# Patient Record
Sex: MALE | Race: WHITE | NOT HISPANIC OR LATINO | Employment: FULL TIME | URBAN - METROPOLITAN AREA
[De-identification: names, ages, dates, MRNs, and addresses within clinical notes are randomized per-mention and may not be internally consistent; named-entity substitution may affect disease eponyms.]

---

## 2017-06-07 ENCOUNTER — TELEPHONE (OUTPATIENT)
Dept: HEMATOLOGY ONCOLOGY | Facility: MEDICAL CENTER | Age: 52
End: 2017-06-07

## 2017-06-07 NOTE — TELEPHONE ENCOUNTER
Received a phone call from this patient stating he would like to establish care in our office. He is currently followed by Dr. Lagos but his office no longer takes his insurance. He states he is needing to have a biopsy done asap and has been holding her Xeralto for one day now. I did inform him that our office would need to obtain his prior medical records and have them approved by Dr. Moss before we could have him scheduled. Patient understood and was agreeable to the current plan. I did ask him to call be back today around 4:30pm if he has not heard back from me for an update.     Dr. Lagos  Ph: [phone]

## 2017-06-08 ENCOUNTER — HOSPITAL ENCOUNTER (OUTPATIENT)
Dept: RADIOLOGY | Facility: MEDICAL CENTER | Age: 52
End: 2017-06-08

## 2017-06-08 ENCOUNTER — HOSPITAL ENCOUNTER (OUTPATIENT)
Facility: MEDICAL CENTER | Age: 52
End: 2017-06-08
Attending: INTERNAL MEDICINE | Admitting: INTERNAL MEDICINE
Payer: COMMERCIAL

## 2017-06-08 ENCOUNTER — TELEPHONE (OUTPATIENT)
Dept: HEMATOLOGY ONCOLOGY | Facility: MEDICAL CENTER | Age: 52
End: 2017-06-08

## 2017-06-08 NOTE — TELEPHONE ENCOUNTER
Called patient back regarding question of whether or not he should hold Xarelto for possible biopsy.  Per LEX Packer plan of care is to get pt scheduled for bx to be performed at [facility].  Informed pt that we will know tomorrow the date of his bx and that Xarelto needs to be help for 3-5 days depending on lab results.  Plan for pt to hold Xarelto tonight in anticipation of bx.  He verbalized understanding and is in agreement with POC.

## 2017-06-08 NOTE — TELEPHONE ENCOUNTER
Called patient back and instructed him to take Xarelto today and tomorrow and hold the medication Saturday-Monday for biopsy on Tuesday June 14.  Pt verbalized understanding.

## 2017-06-12 ENCOUNTER — NON-PROVIDER VISIT (OUTPATIENT)
Dept: HEMATOLOGY ONCOLOGY | Facility: MEDICAL CENTER | Age: 52
End: 2017-06-12
Payer: COMMERCIAL

## 2017-06-12 ENCOUNTER — APPOINTMENT (OUTPATIENT)
Dept: ADMISSIONS | Facility: MEDICAL CENTER | Age: 52
End: 2017-06-12
Payer: COMMERCIAL

## 2017-06-12 ENCOUNTER — OFFICE VISIT (OUTPATIENT)
Dept: HEMATOLOGY ONCOLOGY | Facility: MEDICAL CENTER | Age: 52
End: 2017-06-12
Payer: COMMERCIAL

## 2017-06-12 ENCOUNTER — HOSPITAL ENCOUNTER (OUTPATIENT)
Facility: MEDICAL CENTER | Age: 52
End: 2017-06-12
Attending: INTERNAL MEDICINE
Payer: COMMERCIAL

## 2017-06-12 VITALS — TEMPERATURE: 98.8 F | HEART RATE: 108 BPM | OXYGEN SATURATION: 100 % | RESPIRATION RATE: 16 BRPM | WEIGHT: 297 LBS

## 2017-06-12 DIAGNOSIS — C25.9 PANCREATIC CANCER METASTASIZED TO LIVER (HCC): ICD-10-CM

## 2017-06-12 DIAGNOSIS — C78.7 PANCREATIC CANCER METASTASIZED TO LIVER (HCC): ICD-10-CM

## 2017-06-12 PROCEDURE — 99205 OFFICE O/P NEW HI 60 MIN: CPT | Performed by: INTERNAL MEDICINE

## 2017-06-12 RX ORDER — AMOXICILLIN 250 MG
1 CAPSULE ORAL DAILY
Qty: 30 TAB | Refills: 0 | Status: SHIPPED | OUTPATIENT
Start: 2017-06-12 | End: 2017-06-23

## 2017-06-12 RX ORDER — MORPHINE SULFATE 30 MG/1
30 TABLET, FILM COATED, EXTENDED RELEASE ORAL EVERY 12 HOURS
Qty: 60 TAB | Refills: 0 | Status: ON HOLD | OUTPATIENT
Start: 2017-06-12 | End: 2017-07-05

## 2017-06-12 RX ORDER — OXYCODONE HYDROCHLORIDE 10 MG/1
10 TABLET ORAL EVERY 4 HOURS PRN
Qty: 60 TAB | Refills: 0 | Status: ON HOLD | OUTPATIENT
Start: 2017-06-12 | End: 2017-07-05

## 2017-06-12 ASSESSMENT — PAIN SCALES - GENERAL
PAINLEVEL: 10=SEVERE PAIN
PAINLEVEL: 10=SEVERE PAIN

## 2017-06-12 NOTE — MR AVS SNAPSHOT
Errol Sauceda   2017 3:20 PM   Office Visit   MRN: [id number]    Department:  [facility]   Dept Phone:  [phone]    Description:  Male : 1965   Provider:  Marlon Moss M.D.           Reason for Visit     New Patient Dx: Pancreatic cancer Ref: Dr. Silver Forbes      Allergies as of 2017     Not on File      You were diagnosed with     Pancreatic cancer metastasized to liver (CMS-HCC)   [728905]         Basic Information     Date Of Birth Sex Race Ethnicity Preferred Language    1965 Male White Non- English      Your appointments     2017 10:00 AM   Non Provider 1 with ONC RN 1   [facility] (--)    [address], [address]  [facility] [id number] [phone]           You will be receiving a confirmation call a few days before your appointment from our automated call confirmation system.            2017  8:30 AM   New Chemo 3 with RN 4   [facility] ([facility])    [address]  [facility] [address]   [phone]            2017  4:30 PM   EST Port Flush / Central Line Care with INFUSION QUICK INJECT   [facility] ([facility])    [address]  [facility] [address]   [phone]            2017 10:00 AM   Est Chemo 3 with RN 2   [facility] ([facility])    [address]  [facility] [address]   [phone]            2017  4:00 PM   EST Port Flush / Central Line Care with INFUSION QUICK INJECT   [facility] ([facility])    [address] [address]   [phone]              Problem List              ICD-10-CM Priority Class Noted - Resolved    Pancreatic cancer metastasized to liver (CMS-HCC) C25.9, C78.7   2017 - Present      Health Maintenance        Date Due Completion Dates    IMM DTaP/Tdap/Td Vaccine (1 - Tdap) 5/10/1984 ---    COLONOSCOPY 5/10/2015 ---            Current Immunizations     No immunizations on file.      Below and/or attached  are the medications your provider expects you to take. Review all of your home medications and newly ordered medications with your provider and/or pharmacist. Follow medication instructions as directed by your provider and/or pharmacist. Please keep your medication list with you and share with your provider. Update the information when medications are discontinued, doses are changed, or new medications (including over-the-counter products) are added; and carry medication information at all times in the event of emergency situations     Allergies:  (Not on file)          Medications  Valid as of: June 12, 2017 -  4:29 PM    Generic Name Brand Name Tablet Size Instructions for use    Morphine Sulfate (Tab CR) MS CONTIN 30 MG Take 1 Tab by mouth every 12 hours.        OxyCODONE HCl (Tab) ROXICODONE 10 MG Take 1 Tab by mouth every four hours as needed for Moderate Pain.        Sennosides-Docusate Sodium (Tab) PERICOLACE or SENOKOT S 8.6-50 MG Take 1 Tab by mouth every day.        .                 Medicines prescribed today were sent to:     [facility] [address] [place], [place] [address] [address] [address]    Phone: [phone] Fax: [phone]    Open 24 Hours?: No    [facility] [address] [place], [place] - [address] AT [facility] [address]    [address] [facility] [address]    Phone: [phone] Fax: [phone]    Open 24 Hours?: No      Medication refill instructions:       If your prescription bottle indicates you have medication refills left, it is not necessary to call your provider’s office. Please contact your pharmacy and they will refill your medication.    If your prescription bottle indicates you do not have any refills left, you may request refills at any time through one of the following ways: The online Watchup system (except Urgent Care), by calling your provider’s office, or by asking your pharmacy to contact your provider’s office with a  refill request. Medication refills are processed only during regular business hours and may not be available until the next business day. Your provider may request additional information or to have a follow-up visit with you prior to refilling your medication.   *Please Note: Medication refills are assigned a new Rx number when refilled electronically. Your pharmacy may indicate that no refills were authorized even though a new prescription for the same medication is available at the pharmacy. Please request the medicine by name with the pharmacy before contacting your provider for a refill.        Your To Do List     Future Labs/Procedures Complete By Expires    CA 19-9  As directed 6/12/2018    CEA  As directed 6/12/2018    COMP METABOLIC PANEL  As directed 6/12/2018    IR-PORT VENOUS CATH, ANY METHOD  As directed 6/12/2018    PROTHROMBIN TIME  As directed 6/12/2018    US-NEEDLE BX-LIVER  As directed 6/12/2018         [employer] Access Code: [email or web address]  Expires: 7/12/2017  2:07 PM    Video Passports  A secure, online tool to manage your health information     [employer]’s Video Passports® is a secure, online tool that connects you to your personalized health information from the privacy of your home -- day or night - making it very easy for you to manage your healthcare. Once the activation process is completed, you can even access your medical information using the Video Passports aj, which is available for free in the Apple Aj store or Google Play store.     [employer] Pass[employer] provides the following levels of access (as shown below):   My Chart Features   [facility] Doctor [facility]  Specialists [facility]  Urgent  Care Non-[facility]  Primary Care  Doctor   Email your healthcare team securely and privately 24/7 X X X    Manage appointments: schedule your next appointment; view details of past/upcoming appointments X      Request prescription refills. X      View recent personal medical records, including lab and immunizations X X X X    View health record, including health history, allergies, medications X X X X   Read reports about your outpatient visits, procedures, consult and ER notes X X X X   See your discharge summary, which is a recap of your hospital and/or ER visit that includes your diagnosis, lab results, and care plan. X X       How to register for [employer]:  1. Go to  [email or web address].  2. Click on the Sign Up Now box, which takes you to the New Member Sign Up page. You will need to provide the following information:  a. Enter your [employer] Access Code exactly as it appears at the top of this page. (You will not need to use this code after you’ve completed the sign-up process. If you do not sign up before the expiration date, you must request a new code.)   b. Enter your date of birth.   c. Enter your home email address.   d. Click Submit, and follow the next screen’s instructions.  3. Create a [employer] ID. This will be your [employer] login ID and cannot be changed, so think of one that is secure and easy to remember.  4. Create a [employer] password. You can change your password at any time.  5. Enter your Password Reset Question and Answer. This can be used at a later time if you forget your password.   6. Enter your e-mail address. This allows you to receive e-mail notifications when new information is available in [employer].  7. Click Sign Up. You can now view your health information.    For assistance activating your [employer] account, call [phone]

## 2017-06-12 NOTE — PROGRESS NOTES
Consult Note: Oncology    Date of consultation: 6/12/2017 3:17 PM    Referring provider: Self    Reason for consultation: Pancreatic mass with liver mets.    History of presenting illness:       Errol Sauceda is a 52 y.o. year old male who was recently admitted  at [place] to our hospital with shortness of breath, abdominal pain and was diagnosed with bilateral PE, right lower extremity DVT. Imaging studies also showed a pancreatic mass involving uncinate process along with numerous liver metastases. He saw Dr. Lagos as outpatient. However, due to insurance issues, he could not follow up there. He was tentatively scheduled for liver biopsy by Dr. Lagos for tomorrow. However, this was canceled. He has been off of Xarelto the past for 5 days.    He had significant symptoms including severe abdominal pain, opioid-induced constipation, persistent nausea, anorexia and significant weight loss.    Past Medical History: Diabetes, hypercholesterolemia, hypertension,  No past medical history on file.    Past surgical history: Bariatric gastric sleeve surgery No past surgical history on file.    Allergies:  Review of patient's allergies indicates not on file.    Medications:    Current Outpatient Prescriptions   Medication Sig Dispense Refill   • morphine ER (MS CONTIN) 30 MG Tab CR tablet Take 1 Tab by mouth every 12 hours. 60 Tab 0   • oxycodone immediate release (ROXICODONE) 10 MG immediate release tablet Take 1 Tab by mouth every four hours as needed for Moderate Pain. 60 Tab 0   • senna-docusate (SENNA PLUS) 8.6-50 MG Tab Take 1 Tab by mouth every day. 30 Tab 0     No current facility-administered medications for this visit.       Social History:     Social History     Social History   • Marital Status:      Spouse Name: N/A   • Number of Children: N/A   • Years of Education: N/A     Occupational History   • Not on file.     Social History Main Topics   • Smoking status: Not on file   • Smokeless tobacco: Not on  file   • Alcohol Use: Not on file   • Drug Use: Not on file   • Sexual Activity: Not on file     Other Topics Concern   • Not on file     Social History Narrative   • No narrative on file   . He lives in [place]. He was accompanied by his wife. He has significant history of smoking and drinking, which he quit 10 years ago.    Family History:   No family history on file.    Review of Systems:  All other review of systems are negative except what was mentioned above in the HPI.    Constitutional: No fever, chills, positive for weight loss ,malaise/fatigue.    HEENT: No new auditory or visual complaints. No sore throat and neck pain.     Respiratory:No new cough, sputum production, shortness of breath and wheezing.    Cardiovascular: No new chest pain, palpitations, orthopnea and leg swelling.    Gastrointestinal: As above. No, hematochezia or melena     Genitourinary: Negative for dysuria, hematuria    Musculoskeletal: No new arthralgias or myalgias   Skin: Negative for rash and itching.    Neurological: Negative for focal weakness or headaches.    Endo/Heme/Allergies: No abnormal bleed/bruise.    Psychiatric/Behavioral: No new depression/anxiety.    Physical Exam:  Vitals:   Pulse 108  Temp(Src) 37.1 °C (98.8 °F)  Resp 16  Wt 134.718 kg (297 lb)  SpO2 100%    General: Not in acute distress, alert and oriented x 3, obese  HEENT: No pallor, icterus. Oropharynx clear.   Neck: Supple, no palpable masses.  Lymph nodes: No palpable cervical, supraclavicular, axillary or inguinal lymphadenopathy.    CVS: regular rate and rhythm, no rubs or gallops  RESP: Clear to auscultate bilaterally, no wheezing or crackles.   ABD: Soft, non tender, non distended, positive bowel sounds, no palpable organomegaly  EXT: No edema or cyanosis  CNS: Alert and oriented x3, No focal deficits.  Skin- No rash      Labs:   No results for input(s): RBC, HEMOGLOBIN, HEMATOCRIT, PLATELETCT, PROTHROMBTM, APTT, INR, IRON, FERRITIN, TOTIRONBC in  the last 72 hours.  No results found for: SODIUM, POTASSIUM, CHLORIDE, CO2, GLUCOSE, BUN, CREATININE, BUNCREATRAT, GLOMRATE     Assessment and Plan:    Possible metastatic pancreatic adenocarcinoma- he has not had biopsy done yet. He has been miserable secondary to abdominal pain and constitutional symptoms secondary to her pancreatic cancer.    Had a long discussion with the patient and his wife. Informed them that he has incurable disease with a limited life expectancy. Discussed the option of palliative chemotherapy employing either dose modified FOLFIRINOX versus gemcitabine and Abraxane. Patient wanted to proceed with chemotherapy and then we will schedule him for FOLFIRINOX. He will not get the bolus 5-FU and leucovorin. Oxaliplatin will be dosed at 70 mg/m² and Ironotecan 50 mg/m². He will need a Port-A-Cath. He will receive chemotherapy education  I will make arrangements for him to get ultrasound-guided liver biopsy for tissue diagnosis. We will check tumor markers today. Outside labs shows T bili of 1.7 with normal LFTs. Alkaline phosphatase elevated at 600    Abdomen pain secondary to pancreatic carcinoma- we will start him on MS Contin and switch him to Percocet. I will arrange for celiac plexus block.    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 Instructed him to resume anticoagulation as soon as possible after the interventional radiology procedures.    Malnutrition/diabetes- dietitian consult    Complex patient requiring complex decision making. I have extensively reviewed her prior medical records as part of establishing care with me and formulated the plan.  He agreed and verbalized his agreement and understanding with the current plan.  I answered all questions and concerns he has at this time.              Thank you for allowing me to participate in his care.    Please note that this dictation was created using voice recognition software. I have  made every reasonable attempt to correct obvious errors, but I expect that there are errors of grammar and possibly content that I did not discover before finalizing the note.      SIGNATURES:  Marlon Moss    CC:  Pcp Pt States None  No ref. provider found

## 2017-06-13 ENCOUNTER — APPOINTMENT (OUTPATIENT)
Dept: RADIOLOGY | Facility: MEDICAL CENTER | Age: 52
End: 2017-06-13
Attending: INTERNAL MEDICINE
Payer: COMMERCIAL

## 2017-06-13 ENCOUNTER — NON-PROVIDER VISIT (OUTPATIENT)
Dept: HEMATOLOGY ONCOLOGY | Facility: MEDICAL CENTER | Age: 52
End: 2017-06-13
Payer: COMMERCIAL

## 2017-06-13 VITALS
OXYGEN SATURATION: 100 % | WEIGHT: 297.51 LBS | DIASTOLIC BLOOD PRESSURE: 68 MMHG | RESPIRATION RATE: 16 BRPM | HEART RATE: 108 BPM | TEMPERATURE: 98.8 F | SYSTOLIC BLOOD PRESSURE: 118 MMHG

## 2017-06-13 VITALS
SYSTOLIC BLOOD PRESSURE: 118 MMHG | HEART RATE: 113 BPM | TEMPERATURE: 98 F | RESPIRATION RATE: 16 BRPM | OXYGEN SATURATION: 95 % | DIASTOLIC BLOOD PRESSURE: 68 MMHG | WEIGHT: 297.4 LBS

## 2017-06-13 DIAGNOSIS — C78.7 PANCREATIC CANCER METASTASIZED TO LIVER (HCC): ICD-10-CM

## 2017-06-13 DIAGNOSIS — C25.9 PANCREATIC CANCER METASTASIZED TO LIVER (HCC): ICD-10-CM

## 2017-06-13 RX ORDER — ONDANSETRON 4 MG/1
4 TABLET, FILM COATED ORAL EVERY 4 HOURS PRN
Qty: 30 TAB | Refills: 6 | Status: ON HOLD | OUTPATIENT
Start: 2017-06-13 | End: 2017-07-05

## 2017-06-13 RX ORDER — PROCHLORPERAZINE MALEATE 10 MG
10 TABLET ORAL EVERY 6 HOURS PRN
Qty: 30 TAB | Refills: 6 | Status: ON HOLD | OUTPATIENT
Start: 2017-06-13 | End: 2017-07-03

## 2017-06-13 RX ORDER — LOPERAMIDE HYDROCHLORIDE 2 MG/1
2 TABLET ORAL SEE ADMIN INSTRUCTIONS
Qty: 30 TAB | Refills: 6 | Status: ON HOLD
Start: 2017-06-13 | End: 2017-07-05

## 2017-06-13 RX ORDER — LIDOCAINE AND PRILOCAINE 25; 25 MG/G; MG/G
CREAM TOPICAL
Qty: 1 TUBE | Refills: 3 | Status: ON HOLD | OUTPATIENT
Start: 2017-06-13 | End: 2017-07-05

## 2017-06-13 NOTE — MR AVS SNAPSHOT
Errol Sauceda   2017 10:00 AM   Non-Provider Visit   MRN: [id number]    Department:  [facility]   Dept Phone:  [phone]    Description:  Male : 1965   Provider:  ONC RN 1           Reason for Visit     Other chemo education       Allergies as of 2017     Not on File      Vital Signs     Blood Pressure Pulse Temperature Respirations Weight Oxygen Saturation    118/68 mmHg 113 36.7 °C (98 °F) 16 134.9 kg (297 lb 6.4 oz) 95%      Basic Information     Date Of Birth Sex Race Ethnicity Preferred Language    1965 Male White Non- English      Your appointments     2017  8:00 AM   ART CT60 with [facility] CT 1, NURSE, IR IMAGING(CT/ULTRASOUND), RADIOLOGIST, CT/US INTERVENTIONAL [facility] IMAGING  CT [facility] ([facility])    [address] [place] [address]   [phone]            2017  9:00 AM   CT BX60 with [facility] CT 1, NURSE, IR IMAGING(CT/ULTRASOUND), RADIOLOGIST, CT/US INTERVENTIONAL [facility] CT [facility] ([facility])    [address] [place] [address]   [phone]            2017 10:00 AM   INT FL 60 with [facility] IR 2, RADIOLOGIST, INTERVENTIONAL, [facility], NURSE, IR IMAGING(INTERVENTIONAL)   [facility] IMAGING  INTERVENTIONAL [facility] ([facility]  [address] [place] [address]   [phone]            2017 10:00 AM   New Chemo 3 with RN 1   [facility] ([facility])    [address] [facility] [address]  [place] [address]   [phone]            2017  4:30 PM   EST [place] / Central Line Care with INFUSION QUICK INJECT   [facility] ([facility])    [address] [facility] [address]  [place] [address]   [phone]            2017  1:30 PM   ONCOLOGY EST PATIENT 30 MIN with SLIM WatkinsPDANITA   [facility] (--)    [address], [address]  [place] [address]   [phone]            2017  8:30  AM   Non Provider 1 with ONC RN 2   [facility] (--)    [address], [address]  [place] [address]   [phone]           You will be receiving a confirmation call a few days before your appointment from our automated call confirmation system.            Jul 03, 2017  9:00 AM   ONCOLOGY EST PATIENT 30 MIN with SLIM WatkinsPDANITA   [facility] (--)    [address], [address]  [facility] [address]   [phone]            Jul 03, 2017 11:00 AM   Est Chemo 3 with RN 2   Infusion Services ([facility])    [address] [facility] [address]  [place] [address]   [phone]            Jul 05, 2017  5:00 PM   EST [place] / Central Line Care with INFUSION QUICK INJECT   Infusion [facility] ([facility])    [address] [facility] [address]  [place] [address]   [phone]            Jul 17, 2017  8:30 AM   Non Provider 1 with ONC MA 1   [facility] (--)    [address] [facility] [address]  [facility] [address]   [phone]           You will be receiving a confirmation call a few days before your appointment from our automated call confirmation system.            Jul 17, 2017 10:00 AM   ONCOLOGY EST PATIENT 30 MIN with SLIM WatkinsP.SILKE   [facility] (--)    [address], [address]  [place] [address]   [phone]            Jul 17, 2017 11:00 AM   Est Chemo 3 with RN 2   Infusion Services ([facility])    [address] [facility] [address]  [place] [address]   [phone]            Jul 19, 2017 11:30 AM   EST [place] / Central Line Care with RN 2   Infusion [facility] ([facility])    [address] [facility] [address]  [place] [address]   [phone]            Jul 31, 2017  8:30 AM   Non Provider 1 with ONC RN 1   [facility] (--)    [address], [address]  [place] [address]   [phone]           You will be receiving a confirmation call a few days before your appointment from our automated call confirmation system.            Jul 31, 2017 10:00 AM   ONCOLOGY EST PATIENT 30 MIN with  Marlon Moss M.D.   [facility] (--)    [address], [address]  [place] [address]   [phone]            Jul 31, 2017 11:00 AM   Est Chemo 3 with RN 2   Infusion Services ([facility])    [address] [facility] [address]  [place] [address]   [phone]            Aug 02, 2017 11:30 AM   EST [place] / Central Line Care with RN 2   Infusion Services ([facility])    [address] [facility] [address]  [place] [address]   [phone]              Problem List              ICD-10-CM Priority Class Noted - Resolved    Pancreatic cancer metastasized to liver (CMS-HCC) C25.9, C78.7   6/12/2017 - Present      Health Maintenance        Date Due Completion Dates    IMM DTaP/Tdap/Td Vaccine (1 - Tdap) 5/10/1984 ---    COLONOSCOPY 5/10/2015 ---            Current Immunizations     No immunizations on file.      Below and/or attached are the medications your provider expects you to take. Review all of your home medications and newly ordered medications with your provider and/or pharmacist. Follow medication instructions as directed by your provider and/or pharmacist. Please keep your medication list with you and share with your provider. Update the information when medications are discontinued, doses are changed, or new medications (including over-the-counter products) are added; and carry medication information at all times in the event of emergency situations     Allergies:  (Not on file)          Medications  Valid as of: June 13, 2017 - 11:49 AM    Generic Name Brand Name Tablet Size Instructions for use    Lidocaine-Prilocaine (Cream) EMLA 2.5-2.5 % Apply to port one hour prior to access and cover with plastic wrap.        Loperamide HCl (Tab) IMODIUM A-D 2 MG Take 1 Tab by mouth See Admin Instructions.        Morphine Sulfate (Tab CR) MS CONTIN 30 MG Take 1 Tab by mouth every 12 hours.        Ondansetron HCl (Tab) ZOFRAN 4 MG Take 1 Tab by mouth every four hours as needed for Nausea/Vomiting (for nausea, vomiting).         OxyCODONE HCl (Tab) ROXICODONE 10 MG Take 1 Tab by mouth every four hours as needed for Moderate Pain.        Prochlorperazine Maleate (Tab) COMPAZINE 10 MG Take 1 Tab by mouth every 6 hours as needed (for nausea, vomiting).        Sennosides-Docusate Sodium (Tab) PERICOLACE or SENOKOT S 8.6-50 MG Take 1 Tab by mouth every day.        .                 Medicines prescribed today were sent to:     [facility] [address] [facility], [place] - [address] [facility]    [address] [facility] [address]    Phone: [phone] Fax: [phone]    Open 24 Hours?: No    [facility] [address] [place], [place] - [address] [facility]  [address]    [address] [facility] [address]    Phone: [phone] Fax: [phone]    Open 24 Hours?: No      Medication refill instructions:       If your prescription bottle indicates you have medication refills left, it is not necessary to call your provider’s office. Please contact your pharmacy and they will refill your medication.    If your prescription bottle indicates you do not have any refills left, you may request refills at any time through one of the following ways: The online ClipCard system (except Urgent Care), by calling your provider’s office, or by asking your pharmacy to contact your provider’s office with a refill request. Medication refills are processed only during regular business hours and may not be available until the next business day. Your provider may request additional information or to have a follow-up visit with you prior to refilling your medication.   *Please Note: Medication refills are assigned a new Rx number when refilled electronically. Your pharmacy may indicate that no refills were authorized even though a new prescription for the same medication is available at the pharmacy. Please request the medicine by name with the pharmacy before contacting your provider for a refill.           ClipCard Access Code:  [email or web address]  Expires: 7/12/2017  2:07 PM    Rdio  A secure, online tool to manage your health information     [employer]’s Rdio® is a secure, online tool that connects you to your personalized health information from the privacy of your home -- day or night - making it very easy for you to manage your healthcare. Once the activation process is completed, you can even access your medical information using the Rdio aj, which is available for free in the Apple Aj store or Google Play store.     Rdio provides the following levels of access (as shown below):   My Chart Features   [facility] Primary Care Doctor [facility]  Specialists [facility]  Urgent  Care Non-[facility]  Primary Care  Doctor   Email your healthcare team securely and privately 24/7 X X X    Manage appointments: schedule your next appointment; view details of past/upcoming appointments X      Request prescription refills. X      View recent personal medical records, including lab and immunizations X X X X   View health record, including health history, allergies, medications X X X X   Read reports about your outpatient visits, procedures, consult and ER notes X X X X   See your discharge summary, which is a recap of your hospital and/or ER visit that includes your diagnosis, lab results, and care plan. X X       How to register for Rdio:  1. Go to  [email or web address].  2. Click on the Sign Up Now box, which takes you to the New Member Sign Up page. You will need to provide the following information:  a. Enter your Rdio Access Code exactly as it appears at the top of this page. (You will not need to use this code after you’ve completed the sign-up process. If you do not sign up before the expiration date, you must request a new code.)   b. Enter your date of birth.   c. Enter your home email address.   d. Click Submit, and follow the next screen’s instructions.  3. Create a Rdio ID. This will be your Rdio login ID and cannot be  changed, so think of one that is secure and easy to remember.  4. Create a [place] password. You can change your password at any time.  5. Enter your Password Reset Question and Answer. This can be used at a later time if you forget your password.   6. Enter your e-mail address. This allows you to receive e-mail notifications when new information is available in [employer]ka.  7. Click Sign Up. You can now view your health information.    For assistance activating your [employer]ka account, call [phone]

## 2017-06-13 NOTE — PROGRESS NOTES
Patient is established with Dr. Moss for pancreatic cancer.  She will be starting on FOLFIRINOX chemotherapy, and is here today for a pre-chemotherapy teaching appointment. Patient is accompanied by his spouse.     Educated patient on chemotherapy including but not limited to: Infusion Policies and procedures, cycling of chemotherapy, length of treatment, alopecia, myelosuppression, infection prevention, fatigue, GI distress, use of specific nausea medications, avoidance of alcoholic beverages and supplement medications, use of sunscreen, chemotherapy precautions at home, and invasive procedures. Patient was provided with drug specific handouts, [facility]CI chemotherapy and you book, [facility]CI eating hints book, [facility] side effects sheet. Patient was given tour of Infusion Services and shown where to park and check-in.     He is aware to  the following prescriptions from his pharmacy: Zofran, Compazine, Imodium, and EMLA cream.    A total of 60 minutes was spent with this patient for chemotherapy education.

## 2017-06-14 ENCOUNTER — APPOINTMENT (OUTPATIENT)
Dept: RADIOLOGY | Facility: MEDICAL CENTER | Age: 52
End: 2017-06-14
Attending: INTERNAL MEDICINE
Payer: COMMERCIAL

## 2017-06-14 ENCOUNTER — HOSPITAL ENCOUNTER (OUTPATIENT)
Facility: MEDICAL CENTER | Age: 52
End: 2017-06-14
Attending: INTERNAL MEDICINE | Admitting: INTERNAL MEDICINE
Payer: COMMERCIAL

## 2017-06-14 ENCOUNTER — TELEPHONE (OUTPATIENT)
Dept: PEDIATRIC HEMATOLOGY/ONCOLOGY | Facility: MEDICAL CENTER | Age: 52
End: 2017-06-14

## 2017-06-14 VITALS
OXYGEN SATURATION: 97 % | SYSTOLIC BLOOD PRESSURE: 123 MMHG | DIASTOLIC BLOOD PRESSURE: 73 MMHG | BODY MASS INDEX: 39.14 KG/M2 | TEMPERATURE: 98.5 F | RESPIRATION RATE: 20 BRPM | HEIGHT: 74 IN | HEART RATE: 116 BPM | WEIGHT: 305 LBS

## 2017-06-14 DIAGNOSIS — C78.7 PANCREATIC CANCER METASTASIZED TO LIVER (HCC): ICD-10-CM

## 2017-06-14 DIAGNOSIS — C25.9 PANCREATIC CANCER METASTASIZED TO LIVER (HCC): ICD-10-CM

## 2017-06-14 PROCEDURE — 36561 INSERT TUNNELED CV CATH: CPT

## 2017-06-14 PROCEDURE — 88307 TISSUE EXAM BY PATHOLOGIST: CPT

## 2017-06-14 PROCEDURE — 47000 NEEDLE BIOPSY OF LIVER PERQ: CPT

## 2017-06-14 PROCEDURE — 99153 MOD SED SAME PHYS/QHP EA: CPT

## 2017-06-14 PROCEDURE — 160002 HCHG RECOVERY MINUTES (STAT)

## 2017-06-14 PROCEDURE — 700101 HCHG RX REV CODE 250

## 2017-06-14 PROCEDURE — 99152 MOD SED SAME PHYS/QHP 5/>YRS: CPT

## 2017-06-14 PROCEDURE — 700101 HCHG RX REV CODE 250: Performed by: RADIOLOGY

## 2017-06-14 PROCEDURE — 77001 FLUOROGUIDE FOR VEIN DEVICE: CPT

## 2017-06-14 PROCEDURE — 700111 HCHG RX REV CODE 636 W/ 250 OVERRIDE (IP): Performed by: RADIOLOGY

## 2017-06-14 PROCEDURE — 700117 HCHG RX CONTRAST REV CODE 255: Performed by: INTERNAL MEDICINE

## 2017-06-14 PROCEDURE — 76937 US GUIDE VASCULAR ACCESS: CPT

## 2017-06-14 PROCEDURE — 700111 HCHG RX REV CODE 636 W/ 250 OVERRIDE (IP)

## 2017-06-14 RX ORDER — FLUMAZENIL 0.1 MG/ML
INJECTION INTRAVENOUS
Status: COMPLETED
Start: 2017-06-14 | End: 2017-06-14

## 2017-06-14 RX ORDER — MIDAZOLAM HYDROCHLORIDE 1 MG/ML
INJECTION INTRAMUSCULAR; INTRAVENOUS
Status: COMPLETED
Start: 2017-06-14 | End: 2017-06-14

## 2017-06-14 RX ORDER — SODIUM CHLORIDE 9 MG/ML
INJECTION, SOLUTION INTRAVENOUS
Status: DISCONTINUED | OUTPATIENT
Start: 2017-06-14 | End: 2017-06-14 | Stop reason: HOSPADM

## 2017-06-14 RX ORDER — OXYCODONE HYDROCHLORIDE 5 MG/1
20 TABLET ORAL
Status: DISCONTINUED | OUTPATIENT
Start: 2017-06-14 | End: 2017-06-14 | Stop reason: HOSPADM

## 2017-06-14 RX ORDER — SODIUM CHLORIDE 9 MG/ML
500 INJECTION, SOLUTION INTRAVENOUS
Status: DISPENSED | OUTPATIENT
Start: 2017-06-14 | End: 2017-06-14

## 2017-06-14 RX ORDER — MIDAZOLAM HYDROCHLORIDE 1 MG/ML
.5-2 INJECTION INTRAMUSCULAR; INTRAVENOUS PRN
Status: ACTIVE | OUTPATIENT
Start: 2017-06-14 | End: 2017-06-14

## 2017-06-14 RX ORDER — BUPIVACAINE HYDROCHLORIDE 5 MG/ML
INJECTION, SOLUTION EPIDURAL; INTRACAUDAL
Status: COMPLETED
Start: 2017-06-14 | End: 2017-06-14

## 2017-06-14 RX ORDER — OXYCODONE HYDROCHLORIDE 5 MG/1
10 TABLET ORAL
Status: DISCONTINUED | OUTPATIENT
Start: 2017-06-14 | End: 2017-06-14 | Stop reason: HOSPADM

## 2017-06-14 RX ORDER — ONDANSETRON 2 MG/ML
4 INJECTION INTRAMUSCULAR; INTRAVENOUS PRN
Status: DISCONTINUED | OUTPATIENT
Start: 2017-06-14 | End: 2017-06-14

## 2017-06-14 RX ORDER — ONDANSETRON 2 MG/ML
4 INJECTION INTRAMUSCULAR; INTRAVENOUS EVERY 8 HOURS PRN
Status: DISCONTINUED | OUTPATIENT
Start: 2017-06-14 | End: 2017-06-14 | Stop reason: HOSPADM

## 2017-06-14 RX ORDER — NALOXONE HYDROCHLORIDE 0.4 MG/ML
INJECTION, SOLUTION INTRAMUSCULAR; INTRAVENOUS; SUBCUTANEOUS
Status: COMPLETED
Start: 2017-06-14 | End: 2017-06-14

## 2017-06-14 RX ORDER — BUPIVACAINE HYDROCHLORIDE AND EPINEPHRINE 5; 5 MG/ML; UG/ML
INJECTION, SOLUTION EPIDURAL; INTRACAUDAL; PERINEURAL
Status: COMPLETED
Start: 2017-06-14 | End: 2017-06-14

## 2017-06-14 RX ADMIN — MIDAZOLAM 2 MG: 1 INJECTION INTRAMUSCULAR; INTRAVENOUS at 10:25

## 2017-06-14 RX ADMIN — BUPIVACAINE HYDROCHLORIDE 10 ML: 5 INJECTION, SOLUTION EPIDURAL; INTRACAUDAL; PERINEURAL at 09:40

## 2017-06-14 RX ADMIN — FENTANYL CITRATE 50 MCG: 50 INJECTION, SOLUTION INTRAMUSCULAR; INTRAVENOUS at 09:30

## 2017-06-14 RX ADMIN — MIDAZOLAM 1 MG: 1 INJECTION INTRAMUSCULAR; INTRAVENOUS at 08:51

## 2017-06-14 RX ADMIN — MIDAZOLAM 2 MG: 1 INJECTION INTRAMUSCULAR; INTRAVENOUS at 08:39

## 2017-06-14 RX ADMIN — MIDAZOLAM 1 MG: 1 INJECTION INTRAMUSCULAR; INTRAVENOUS at 08:47

## 2017-06-14 RX ADMIN — HEPARIN: 100 SYRINGE at 09:00

## 2017-06-14 RX ADMIN — MIDAZOLAM 1 MG: 1 INJECTION INTRAMUSCULAR; INTRAVENOUS at 10:31

## 2017-06-14 RX ADMIN — ALCOHOL 30 ML: 0.98 INJECTION INTRASPINAL at 09:40

## 2017-06-14 RX ADMIN — BUPIVACAINE HYDROCHLORIDE 5 ML: 5 INJECTION, SOLUTION EPIDURAL; INTRACAUDAL; PERINEURAL at 09:40

## 2017-06-14 RX ADMIN — SODIUM CHLORIDE: 9 INJECTION, SOLUTION INTRAVENOUS at 08:20

## 2017-06-14 RX ADMIN — FENTANYL CITRATE 50 MCG: 50 INJECTION, SOLUTION INTRAMUSCULAR; INTRAVENOUS at 09:38

## 2017-06-14 RX ADMIN — FENTANYL CITRATE 50 MCG: 50 INJECTION, SOLUTION INTRAMUSCULAR; INTRAVENOUS at 10:29

## 2017-06-14 RX ADMIN — FENTANYL CITRATE 50 MCG: 50 INJECTION, SOLUTION INTRAMUSCULAR; INTRAVENOUS at 10:25

## 2017-06-14 RX ADMIN — FENTANYL CITRATE 50 MCG: 50 INJECTION, SOLUTION INTRAMUSCULAR; INTRAVENOUS at 08:39

## 2017-06-14 RX ADMIN — FENTANYL CITRATE 50 MCG: 50 INJECTION, SOLUTION INTRAMUSCULAR; INTRAVENOUS at 08:51

## 2017-06-14 RX ADMIN — IOHEXOL 3 ML: 180 INJECTION INTRAVENOUS at 10:30

## 2017-06-14 RX ADMIN — MIDAZOLAM 1 MG: 1 INJECTION INTRAMUSCULAR; INTRAVENOUS at 08:56

## 2017-06-14 ASSESSMENT — PAIN SCALES - GENERAL
PAINLEVEL_OUTOF10: 0
PAINLEVEL_OUTOF10: 0
PAINLEVEL_OUTOF10: ASSUMED PAIN PRESENT
PAINLEVEL_OUTOF10: 0

## 2017-06-14 NOTE — OR NURSING
1117   RECEIVED FROM IR,  S/P LIVER BX,  PORT PLACEMENT, AND ILIAC NERVE ROOT ABLATION.  PATIENT IS A/A/0X4.  WIFE AT BEDSIDE.  RIGHT ABDOMEN LIVER BX SITE,  LUMBAR BACK INCISION WITH 2 BAND-AIDS,  AND PORT PLACEMENT INCISION RIGHT SC.      1145   ALL SITES ARE CLEAR.  PATIENT UP AND AMBULATE TO BATHROOM WITHOUT DIFFICULTY.    1230   PATIENT UP AND AMBULATE TO BATHROOM WITHOUT DIFFICULTY.  DENIES ANY PAIN.  ALL SITES ARE CLEAR.    1335   DC INSTRUCTIONS GIVEN TO PATIENT AND WIFE.  VERBALIZED UNDERSTANDING OF ALL.  HL DC.  PATIENT DC TO HOME,  VIA WHEELCHAIR,  ESCORTED OUT BY CNA.

## 2017-06-14 NOTE — PROGRESS NOTES
IR NOTE:  BARD POWER PORT PLACED IN RIGHT IJ/SC 8R 25CM LOT [id number] REF [id number], PT TOLERATED WELL AND HEMODYNAMICALLY STABLE ON 4L NC.

## 2017-06-14 NOTE — DISCHARGE INSTRUCTIONS
ACTIVITY: Rest and take it easy for the first 24 hours.  A responsible adult is recommended to remain with you during that time.  It is normal to feel sleepy.  We encourage you to not do anything that requires balance, judgment or coordination.    MILD FLU-LIKE SYMPTOMS ARE NORMAL. YOU MAY EXPERIENCE GENERALIZED MUSCLE ACHES, THROAT IRRITATION, HEADACHE AND/OR SOME NAUSEA.    FOR 24 HOURS DO NOT:  Drive, operate machinery or run household appliances.  Drink beer or alcoholic beverages.   Make important decisions or sign legal documents.    SPECIAL INSTRUCTIONS: **KEEP INCISION DRY FOR 24 HRS. *    DIET: To avoid nausea, slowly advance diet as tolerated, avoiding spicy or greasy foods for the first day.  Add more substantial food to your diet according to your physician's instructions.  Babies can be fed formula or breast milk as soon as they are hungry.  INCREASE FLUIDS AND FIBER TO AVOID CONSTIPATION.    SURGICAL DRESSING/BATHING: *MAY SHOWER TOMORROW AFTERNOON THEN MAY REMOVE BAND-AID.  KEEP PORT SITE INCISION DRY FOR 7 DAYS.  **    FOLLOW-UP APPOINTMENT:  A follow-up appointment should be arranged with your doctor in **KEEP APPT WITH INFUSION CENTER.  FOLLOW-UP WITH DR PATEL  [phone] *; call to schedule.    You should CALL YOUR PHYSICIAN if you develop:  Fever greater than 101 degrees F.  Pain not relieved by medication, or persistent nausea or vomiting.  Excessive bleeding (blood soaking through dressing) or unexpected drainage from the wound.  Extreme redness or swelling around the incision site, drainage of pus or foul smelling drainage.  Inability to urinate or empty your bladder within 8 hours.  Problems with breathing or chest pain.    You should call 911 if you develop problems with breathing or chest pain.  If you are unable to contact your doctor or surgical center, you should go to the nearest emergency room or urgent care center.  Physician's telephone #: *[phone]**    If any questions arise, call  your doctor.  If your doctor is not available, please feel free to call the Surgical Center at [phone].  The Center is open Monday through Friday from 7AM to 7PM.  You can also call the HEALTH HOTLINE open 24 hours/day, 7 days/week and speak to a nurse at [phone], or toll free at [phone].    A registered nurse may call you a few days after your surgery to see how you are doing after your procedure.    MEDICATIONS: Resume taking daily medication.  Take prescribed pain medication with food.  If no medication is prescribed, you may take non-aspirin pain medication if needed.  PAIN MEDICATION CAN BE VERY CONSTIPATING.  Take a stool softener or laxative such as senokot, pericolace, or milk of magnesia if needed.      If your physician has prescribed pain medication that includes Acetaminophen (Tylenol), do not take additional Acetaminophen (Tylenol) while taking the prescribed medication.    Depression / Suicide Risk    As you are discharged from this [facility] facility, it is important to learn how to keep safe from harming yourself.    Recognize the warning signs:  · Abrupt changes in personality, positive or negative- including increase in energy   · Giving away possessions  · Change in eating patterns- significant weight changes-  positive or negative  · Change in sleeping patterns- unable to sleep or sleeping all the time   · Unwillingness or inability to communicate  · Depression  · Unusual sadness, discouragement and loneliness  · Talk of wanting to die  · Neglect of personal appearance   · Rebelliousness- reckless behavior  · Withdrawal from people/activities they love  · Confusion- inability to concentrate     If you or a loved one observes any of these behaviors or has concerns about self-harm, here's what you can do:  · Talk about it- your feelings and reasons for harming yourself  · Remove any means that you might use to hurt yourself (examples: pills, rope, extension cords,  firearm)  · Get professional help from the community (Mental Health, Substance Abuse, psychological counseling)  · Do not be alone:Call your Safe Contact- someone whom you trust who will be there for you.  · Call your local CRISIS HOTLINE 130-5453 or 007-983-7871  · Call your local Children's Mobile Crisis Response Team [place] (660) 615-1400 or www.E-Line Media  · Call the toll free National Suicide Prevention Hotlines   · National Suicide Prevention Lifeline 243-368-LNQL (7058)  · National Hope Line Network 800-SUICIDE (575-3393)

## 2017-06-14 NOTE — IP AVS SNAPSHOT
6/14/2017    Errol Sauceda  [id number]  [place] [address]    Dear Errol:    [facility] wants to ensure your discharge home is safe and you or your loved ones have had all of your questions answered regarding your care after you leave the hospital.    Below is a list of resources and contact information should you have any questions regarding your hospital stay, follow-up instructions, or active medical symptoms.    Questions or Concerns Regarding… Contact   Medical Questions Related to Your Discharge  (7 days a week, 8am-5pm) Contact a Nurse Care Coordinator   [phone]   Medical Questions Not Related to Your Discharge  (24 hours a day / 7 days a week)  Contact the Nurse Health Line   [phone]    Medications or Discharge Instructions Refer to your discharge packet   or contact your [facility] Primary Care Provider   [phone]   Follow-up Appointment(s) Schedule your appointment via PostHelpers   or contact Scheduling [phone]   Billing Review your statement via PostHelpers  or contact Billing [phone]   Medical Records Review your records via PostHelpers   or contact Medical Records [phone]     You may receive a telephone call within two days of discharge. This call is to make certain you understand your discharge instructions and have the opportunity to have any questions answered. You can also easily access your medical information, test results and upcoming appointments via the PostHelpers free online health management tool. You can learn more and sign up at [employer]ari/PostHelpers. For assistance setting up your PostHelpers account, please call [phone].    Once again, we want to ensure your discharge home is safe and that you have a clear understanding of any next steps in your care. If you have any questions or concerns, please do not hesitate to contact us, we are here for you. Thank you for choosing [facility] for your healthcare needs.    Sincerely,    Your [facility] Healthcare Team

## 2017-06-14 NOTE — OR SURGEON
Immediate Post- Operative Note        PostOp Diagnosis: METASTATIC PANC CA      Procedure(s): LIVER BIOPSY, PORTACATH PLACEMENT, AND CELIAC PLEXUS BLOCK      Estimated Blood Loss: Less than 25 ml        Complications: None            6/14/2017     8:56 AM     Cleveland Lemus

## 2017-06-14 NOTE — IP AVS SNAPSHOT
" Home Care Instructions                                                                                                                Name:Errol Sauceda  Medical Record Number:[id number]  CSN: [id number]    YOB: 1965   Age: 52 y.o.  Sex: male  HT:1.88 m (6' 2.02\") WT: 138.347 kg (305 lb)          Admit Date: 6/14/2017     Discharge Date:   Today's Date: 6/14/2017  Attending Doctor:  Marlon Patel M.D.                  Allergies:  Review of patient's allergies indicates no known allergies.                Discharge Instructions         ACTIVITY: Rest and take it easy for the first 24 hours.  A responsible adult is recommended to remain with you during that time.  It is normal to feel sleepy.  We encourage you to not do anything that requires balance, judgment or coordination.    MILD FLU-LIKE SYMPTOMS ARE NORMAL. YOU MAY EXPERIENCE GENERALIZED MUSCLE ACHES, THROAT IRRITATION, HEADACHE AND/OR SOME NAUSEA.    FOR 24 HOURS DO NOT:  Drive, operate machinery or run household appliances.  Drink beer or alcoholic beverages.   Make important decisions or sign legal documents.    SPECIAL INSTRUCTIONS: **KEEP INCISION DRY FOR 24 HRS. *    DIET: To avoid nausea, slowly advance diet as tolerated, avoiding spicy or greasy foods for the first day.  Add more substantial food to your diet according to your physician's instructions.  Babies can be fed formula or breast milk as soon as they are hungry.  INCREASE FLUIDS AND FIBER TO AVOID CONSTIPATION.    SURGICAL DRESSING/BATHING: *MAY SHOWER TOMORROW AFTERNOON THEN MAY REMOVE BAND-AID.  KEEP PORT SITE INCISION DRY FOR 7 DAYS.  **    FOLLOW-UP APPOINTMENT:  A follow-up appointment should be arranged with your doctor in **KEEP APPT WITH INFUSION CENTER.  FOLLOW-UP WITH DR PATEL  [phone] *; call to schedule.    You should CALL YOUR PHYSICIAN if you develop:  Fever greater than 101 degrees F.  Pain not relieved by medication, or persistent nausea or vomiting.  Excessive " bleeding (blood soaking through dressing) or unexpected drainage from the wound.  Extreme redness or swelling around the incision site, drainage of pus or foul smelling drainage.  Inability to urinate or empty your bladder within 8 hours.  Problems with breathing or chest pain.    You should call 911 if you develop problems with breathing or chest pain.  If you are unable to contact your doctor or surgical center, you should go to the nearest emergency room or urgent care center.  Physician's telephone #: *[phone]**    If any questions arise, call your doctor.  If your doctor is not available, please feel free to call the Surgical Center at [phone].  The Center is open Monday through Friday from 7AM to 7PM.  You can also call the [employer] HOTLINE open 24 hours/day, 7 days/week and speak to a nurse at [phone], or toll free at [phone].    A registered nurse may call you a few days after your surgery to see how you are doing after your procedure.    MEDICATIONS: Resume taking daily medication.  Take prescribed pain medication with food.  If no medication is prescribed, you may take non-aspirin pain medication if needed.  PAIN MEDICATION CAN BE VERY CONSTIPATING.  Take a stool softener or laxative such as senokot, pericolace, or milk of magnesia if needed.      If your physician has prescribed pain medication that includes Acetaminophen (Tylenol), do not take additional Acetaminophen (Tylenol) while taking the prescribed medication.    Depression / Suicide Risk    As you are discharged from this [facility] facility, it is important to learn how to keep safe from harming yourself.    Recognize the warning signs:  · Abrupt changes in personality, positive or negative- including increase in energy   · Giving away possessions  · Change in eating patterns- significant weight changes-  positive or negative  · Change in sleeping patterns- unable to sleep or sleeping all the time   · Unwillingness or  inability to communicate  · Depression  · Unusual sadness, discouragement and loneliness  · Talk of wanting to die  · Neglect of personal appearance   · Rebelliousness- reckless behavior  · Withdrawal from people/activities they love  · Confusion- inability to concentrate     If you or a loved one observes any of these behaviors or has concerns about self-harm, here's what you can do:  · Talk about it- your feelings and reasons for harming yourself  · Remove any means that you might use to hurt yourself (examples: pills, rope, extension cords, firearm)  · Get professional help from the community (Mental Health, Substance Abuse, psychological counseling)  · Do not be alone:Call your Safe Contact- someone whom you trust who will be there for you.  · Call your local CRISIS HOTLINE 307-1940 or 791-648-3118  · Call your local Children's Mobile Crisis Response Team [place] (560) 208-7410 or www.[employer]  · Call the toll free National Suicide Prevention Hotlines   · National Suicide Prevention Lifeline 405-358-PUJC (2847)  · [employer] Line Network 800-SUICIDE (874-9745)       Medication List      CONTINUE taking these medications        Instructions    Morning Afternoon Evening Bedtime    lidocaine-prilocaine 2.5-2.5 % Crea   Commonly known as:  EMLA        Doctor's comments:  Directions: Apply to port 1 hour prior to access of port and cover with plastic wrap.   Apply to port one hour prior to access and cover with plastic wrap.                        loperamide 2 MG tablet   Commonly known as:  IMODIUM A-D        Doctor's comments:  Give loperamide 4 mg orally first dose, then 2 mg orally with every loose bowel movement.  Contact physician if maximum of 16 mg/24 hours is exceeded.   Take 1 Tab by mouth See Admin Instructions.   Dose:  2 mg                        morphine ER 30 MG Tbcr tablet   Commonly known as:  MS CONTIN        Take 1 Tab by mouth every 12 hours.   Dose:  30 mg                         ondansetron 4 MG Tabs tablet   Commonly known as:  ZOFRAN        Take 1 Tab by mouth every four hours as needed for Nausea/Vomiting (for nausea, vomiting).   Dose:  4 mg                        oxycodone immediate release 10 MG immediate release tablet   Commonly known as:  ROXICODONE        Take 1 Tab by mouth every four hours as needed for Moderate Pain.   Dose:  10 mg                        prochlorperazine 10 MG Tabs   Commonly known as:  COMPAZINE        Take 1 Tab by mouth every 6 hours as needed (for nausea, vomiting).   Dose:  10 mg                        senna-docusate 8.6-50 MG Tabs   Commonly known as:  SENNA PLUS        Take 1 Tab by mouth every day.   Dose:  1 Tab                                Medication Information     Above and/or attached are the medications your physician expects you to take upon discharge. Review all of your home medications and newly ordered medications with your doctor and/or pharmacist. Follow medication instructions as directed by your doctor and/or pharmacist. Please keep your medication list with you and share with your physician. Update the information when medications are discontinued, doses are changed, or new medications (including over-the-counter products) are added; and carry medication information at all times in the event of emergency situations.        Resources     Quit Smoking / Tobacco Use:    I understand the use of any tobacco products increases my chance of suffering from future heart disease or stroke and could cause other illnesses which may shorten my life. Quitting the use of tobacco products is the single most important thing I can do to improve my health. For further information on smoking / tobacco cessation call a Toll Free Quit Line at 1-824.341.4989 (*National Cancer [place]) or 1-398.646.2080 (American Lung Association) or you can access the web based program at www.lungusa.org.    [place] Tobacco Users Help Line:  (817) 559-3503       Toll Free:  7-191-679-3157  Quit Tobacco Program [facility] (760)770-5953    Crisis Hotline:    National Crisis Hotline:  4-415-JGNJBNB or 1-417.451.8953    [place] Crisis Hotline:    1-659.996.8641 or 921-522-4915    Discharge Survey:   Thank you for choosing [facility]. We hope we did everything we could to make your hospital stay a pleasant one. You may be receiving a survey and we would appreciate your time and participation in answering the questions. Your input is very valuable to us in our efforts to improve our service to our patients and their families.            Signatures     My signature on this form indicates that:    1. I acknowledge receipt and understanding of these Home Care Instruction.  2. My questions regarding this information have been answered to my satisfaction.  3. I have formulated a plan with my discharge nurse to obtain my prescribed medications for home.    __________________________________      __________________________________                   Patient Signature                                 Guardian/Responsible Adult Signature      __________________________________                 __________       ________                       Nurse Signature                                               Date                 Time

## 2017-06-14 NOTE — TELEPHONE ENCOUNTER
Call received from [employer] to relay lab results; however, pt not with this practice. Please call [employer] back at [phone], ID#: [id number] to obtain results.

## 2017-06-14 NOTE — PROGRESS NOTES
IR Progress Note:    Liver mass biopsy with 3 cores taken by Dr. Lemus. Pt tolerated procedure well and remained hemodynamically stable throughout.     Patient position changed to prone position. Celiac plexus nerve block by Dr. Lemus. Pt tolerated procedure well and remained hemodynamically stable throughout.    Pt moved to IR2 with bedside report and transfer of care to KELSEY Garcia RN.

## 2017-06-19 ENCOUNTER — OUTPATIENT INFUSION SERVICES (OUTPATIENT)
Dept: ONCOLOGY | Facility: MEDICAL CENTER | Age: 52
End: 2017-06-19
Attending: INTERNAL MEDICINE
Payer: COMMERCIAL

## 2017-06-19 ENCOUNTER — TELEPHONE (OUTPATIENT)
Dept: HEMATOLOGY ONCOLOGY | Facility: MEDICAL CENTER | Age: 52
End: 2017-06-19

## 2017-06-19 VITALS
HEIGHT: 73 IN | HEART RATE: 91 BPM | SYSTOLIC BLOOD PRESSURE: 121 MMHG | BODY MASS INDEX: 39.82 KG/M2 | DIASTOLIC BLOOD PRESSURE: 68 MMHG | TEMPERATURE: 98.6 F | RESPIRATION RATE: 18 BRPM | OXYGEN SATURATION: 94 % | WEIGHT: 300.49 LBS

## 2017-06-19 DIAGNOSIS — C78.7 PANCREATIC CANCER METASTASIZED TO LIVER (HCC): ICD-10-CM

## 2017-06-19 DIAGNOSIS — C25.9 PANCREATIC CANCER METASTASIZED TO LIVER (HCC): ICD-10-CM

## 2017-06-19 LAB
ALBUMIN SERPL BCP-MCNC: 3.2 G/DL (ref 3.2–4.9)
ALBUMIN/GLOB SERPL: 0.9 G/DL
ALP SERPL-CCNC: 736 U/L (ref 30–99)
ALT SERPL-CCNC: 63 U/L (ref 2–50)
ANION GAP SERPL CALC-SCNC: 10 MMOL/L (ref 0–11.9)
AST SERPL-CCNC: 93 U/L (ref 12–45)
BASOPHILS # BLD AUTO: 0.6 % (ref 0–1.8)
BASOPHILS # BLD: 0.07 K/UL (ref 0–0.12)
BILIRUB SERPL-MCNC: 3.3 MG/DL (ref 0.1–1.5)
BUN SERPL-MCNC: 11 MG/DL (ref 8–22)
CALCIUM SERPL-MCNC: 8.9 MG/DL (ref 8.5–10.5)
CANCER AG19-9 SERPL-ACNC: ABNORMAL U/ML (ref 0–35)
CHLORIDE SERPL-SCNC: 99 MMOL/L (ref 96–112)
CO2 SERPL-SCNC: 29 MMOL/L (ref 20–33)
CREAT SERPL-MCNC: 0.54 MG/DL (ref 0.5–1.4)
EOSINOPHIL # BLD AUTO: 0.47 K/UL (ref 0–0.51)
EOSINOPHIL NFR BLD: 4.1 % (ref 0–6.9)
ERYTHROCYTE [DISTWIDTH] IN BLOOD BY AUTOMATED COUNT: 44.5 FL (ref 35.9–50)
GFR SERPL CREATININE-BSD FRML MDRD: >60 ML/MIN/1.73 M 2
GLOBULIN SER CALC-MCNC: 3.4 G/DL (ref 1.9–3.5)
GLUCOSE SERPL-MCNC: 238 MG/DL (ref 65–99)
HCT VFR BLD AUTO: 39.1 % (ref 42–52)
HGB BLD-MCNC: 12.8 G/DL (ref 14–18)
IMM GRANULOCYTES # BLD AUTO: 0.14 K/UL (ref 0–0.11)
IMM GRANULOCYTES NFR BLD AUTO: 1.2 % (ref 0–0.9)
LYMPHOCYTES # BLD AUTO: 0.71 K/UL (ref 1–4.8)
LYMPHOCYTES NFR BLD: 6.2 % (ref 22–41)
MAGNESIUM SERPL-MCNC: 2 MG/DL (ref 1.5–2.5)
MCH RBC QN AUTO: 28.9 PG (ref 27–33)
MCHC RBC AUTO-ENTMCNC: 32.7 G/DL (ref 33.7–35.3)
MCV RBC AUTO: 88.3 FL (ref 81.4–97.8)
MONOCYTES # BLD AUTO: 1.09 K/UL (ref 0–0.85)
MONOCYTES NFR BLD AUTO: 9.5 % (ref 0–13.4)
NEUTROPHILS # BLD AUTO: 9.03 K/UL (ref 1.82–7.42)
NEUTROPHILS NFR BLD: 78.4 % (ref 44–72)
NRBC # BLD AUTO: 0 K/UL
NRBC BLD AUTO-RTO: 0 /100 WBC
PLATELET # BLD AUTO: 160 K/UL (ref 164–446)
PMV BLD AUTO: 11 FL (ref 9–12.9)
POTASSIUM SERPL-SCNC: 3.6 MMOL/L (ref 3.6–5.5)
PROT SERPL-MCNC: 6.6 G/DL (ref 6–8.2)
RBC # BLD AUTO: 4.43 M/UL (ref 4.7–6.1)
SODIUM SERPL-SCNC: 138 MMOL/L (ref 135–145)
WBC # BLD AUTO: 11.5 K/UL (ref 4.8–10.8)

## 2017-06-19 PROCEDURE — 96367 TX/PROPH/DG ADDL SEQ IV INF: CPT

## 2017-06-19 PROCEDURE — 80053 COMPREHEN METABOLIC PANEL: CPT

## 2017-06-19 PROCEDURE — 86301 IMMUNOASSAY TUMOR CA 19-9: CPT

## 2017-06-19 PROCEDURE — 700111 HCHG RX REV CODE 636 W/ 250 OVERRIDE (IP): Performed by: INTERNAL MEDICINE

## 2017-06-19 PROCEDURE — 96413 CHEMO IV INFUSION 1 HR: CPT

## 2017-06-19 PROCEDURE — 700111 HCHG RX REV CODE 636 W/ 250 OVERRIDE (IP)

## 2017-06-19 PROCEDURE — 700105 HCHG RX REV CODE 258: Performed by: INTERNAL MEDICINE

## 2017-06-19 PROCEDURE — 96417 CHEMO IV INFUS EACH ADDL SEQ: CPT

## 2017-06-19 PROCEDURE — 85025 COMPLETE CBC W/AUTO DIFF WBC: CPT

## 2017-06-19 PROCEDURE — 96415 CHEMO IV INFUSION ADDL HR: CPT

## 2017-06-19 PROCEDURE — A4212 NON CORING NEEDLE OR STYLET: HCPCS

## 2017-06-19 PROCEDURE — 96375 TX/PRO/DX INJ NEW DRUG ADDON: CPT

## 2017-06-19 PROCEDURE — 83735 ASSAY OF MAGNESIUM: CPT

## 2017-06-19 PROCEDURE — 700105 HCHG RX REV CODE 258

## 2017-06-19 PROCEDURE — G0498 CHEMO EXTEND IV INFUS W/PUMP: HCPCS

## 2017-06-19 RX ORDER — DEXTROSE MONOHYDRATE 50 MG/ML
INJECTION, SOLUTION INTRAVENOUS CONTINUOUS
Status: DISCONTINUED | OUTPATIENT
Start: 2017-06-19 | End: 2017-06-19 | Stop reason: HOSPADM

## 2017-06-19 RX ORDER — EMPAGLIFLOZIN 25 MG/1
25 TABLET, FILM COATED ORAL DAILY
COMMUNITY
Start: 2017-06-05

## 2017-06-19 RX ORDER — RIVAROXABAN 15 MG/1
15 TABLET, FILM COATED ORAL 2 TIMES DAILY
COMMUNITY
Start: 2017-06-05

## 2017-06-19 RX ORDER — PALONOSETRON 0.05 MG/ML
0.25 INJECTION, SOLUTION INTRAVENOUS ONCE
Status: COMPLETED | OUTPATIENT
Start: 2017-06-19 | End: 2017-06-19

## 2017-06-19 RX ADMIN — OXALIPLATIN 185.5 MG: 5 INJECTION, SOLUTION, CONCENTRATE INTRAVENOUS at 12:40

## 2017-06-19 RX ADMIN — ATROPINE SULFATE 0.5 MG: 1 INJECTION, SOLUTION INTRAMUSCULAR; INTRAVENOUS; SUBCUTANEOUS at 14:53

## 2017-06-19 RX ADMIN — PALONOSETRON HYDROCHLORIDE 0.25 MG: 0.25 INJECTION INTRAVENOUS at 11:22

## 2017-06-19 RX ADMIN — DEXTROSE MONOHYDRATE: 50 INJECTION, SOLUTION INTRAVENOUS at 11:21

## 2017-06-19 RX ADMIN — DEXTROSE MONOHYDRATE 397.6 MG: 50 INJECTION, SOLUTION INTRAVENOUS at 14:57

## 2017-06-19 RX ADMIN — DEXAMETHASONE SODIUM PHOSPHATE 12 MG: 4 INJECTION, SOLUTION INTRAMUSCULAR; INTRAVENOUS at 11:24

## 2017-06-19 RX ADMIN — SODIUM CHLORIDE 150 MG: 9 INJECTION, SOLUTION INTRAVENOUS at 11:53

## 2017-06-19 RX ADMIN — FLUOROURACIL 6360 MG: 50 INJECTION, SOLUTION INTRAVENOUS at 17:08

## 2017-06-19 ASSESSMENT — PAIN SCALES - GENERAL: PAINLEVEL: 5=MODERATE PAIN

## 2017-06-19 NOTE — PROGRESS NOTES
"Patient Name: Errol Sauceda   Dx: Pancreatic mass with liver mets       Protocol: FOLFIRINOX  OXALIplatin 85 mg/m2 IV over 2 hours on Day 1   -Dose reduced 70 mg/m2 with cycle 1 for anticipated tolearance  Irinotecan 180 mg/m2 IV over 90 min on Day 1  -Dose reduced to 150 mg/m2 with cycle 1 for elevated Tbili and anticipated tolerance. Over 2 hours per treatment plan.       Removed with cycle 1 for anticipated tolerance per MD Removed with cycle 1 for anticipated tolerance per MD Removed with cycle 1 for anticipated tolerance per MD Fluorouracil 2400 mg/m2 CIVI via CADD pump over 46 hours  Repeat every 14 days for 4-12 cycles (unresectable/locally advanced) or Until DP/UT (metastatic)  NCCN Guidelines for Pancreatic Adenocarcinoma. V.2.2016.  Yoel T, et al. N Engl J Med. 2011;364(19):1817-55.          Allergies:  Review of patient's allergies indicates no known allergies.     /68 mmHg  Pulse 91  Temp(Src) 37 °C (98.6 °F)  Resp 18  Ht 1.854 m (6' 1\")  Wt 136.3 kg (300 lb 7.8 oz)  BMI 39.65 kg/m2  SpO2 94% Body surface area is 2.65 meters squared.     Labs 6/19/17:  ANC~ 9030 Plt = 160k   Hgb = 12.8   SCr = 0.54 mg/dL CrCl > 125 mL/min (min Scr 0.7)    AST/ALT/AP/TBili = 93/63/736/3.3   Mag = 2  K+ = 3.6  **Labs reviewed by glen Leyva to treat with dose reductions as previously ordered, follow up CMP in 1 week.      Drug Order   (Drug name, dose, route, IV Fluid & volume, frequency, number of doses) Cycle: 1      Previous treatment: N/A      Medication = OXALIplatin  Base Dose= 70 mg/m2  Calc Dose: Base Dose x 2.65 m2 = 185.5 mg  Final Dose = 185.5 mg  Route = IV  Fluid & Volume = D5W 250 mL  Admin Duration = Over 2 hours           <5% difference, ok to treat with final  dose     Medication = Irinotecan  Base Dose= 150 mg/m2  Calc Dose: Base Dose x 2.65 m2 = 397.5 mg  Final Dose = 397.6 mg   Route = IV  Fluid & Volume = D5W 500 mL  Admin Duration = Over 90 min             <5% difference, ok to " treat with final  dose   Medication = Fluorouracil   Base Dose= 2400 mg/m2  Calc Dose:Base Dose x 2.65 m2 = 6360 mg  Final Dose = 6360 mg  Route = IV  Fluid & Volume = 50 mg/mL = 127.2 mL (+3ml overfill = 130.2 mL)   Admin Duration = Over 46 hr  Rate = 2.8            <5% difference, ok to treat with final  dose     By my signature below, I confirm this process was performed independently with the BSA and all final chemotherapy dosing calculations congruent. I have reviewed the above chemotherapy order and that my calculation of the final dose and BSA (when applicable) corroborate those calculations of the  pharmacist. Discrepancies of 5% or greater in the written dose have been addressed and documented within the EPIC Progress notes.    Signature: Melodie Hill, NohemiD

## 2017-06-19 NOTE — PROGRESS NOTES
"Chemotherapy Verification - SECONDARY RN       Height = 71\"  Weight = 136.3 kg  BSA = 2.6 m2       Medication: Oxaliplatin  Dose: 70 mg/m2  Calculated Dose: 182 mg                             Medication: Irinotecan  Dose: 150 mg/m2  Calculated Dose: 390.6 mg                              Medication: Adrucil  Dose: 2400 mg/m2 home pump  Calculated Dose: 6240 mg                              I confirm that this process was performed independently.   "

## 2017-06-19 NOTE — PROGRESS NOTES
Chemotherapy Verification - PRIMARY RN      Height = 185.4cm  Weight = 136.3kg  BSA = 2.65m2       Medication: Oxaliplatin  Dose: 70mg/m2  Calculated Dose: 185.5mg                             (In mg/m2, AUC, mg/kg)     Medication: Irinotecan  Dose: 150mg/m2  Calculated Dose: 397.5mg                             (In mg/m2, AUC, mg/kg)    Medication: Fluorouracil  Dose: 2400mg/m2  Calculated Dose: 6360mg (continuous infusion over 46 hrs)                             (In mg/m2, AUC, mg/kg)    I confirm this process was performed independently with the BSA and all final chemotherapy dosing calculations congruent.  Any discrepancies of 5% or greater have been addressed with the chemotherapy pharmacist. The resolution of the discrepancy has been documented in the EPIC progress notes.

## 2017-06-19 NOTE — PROGRESS NOTES
"Pharmacy Chemotherapy Calculations    Dx: Pancreatic CA, liver mets  Cycle: 1 (Previous treatment = NA)  Regimen and Dosing Reference  FOLFIRINOX     *Dosing Reference*  oxaliplatin 85mg/m2 IV over 2  Hours on day 1 Isa LO reduced dose to 70 mg/m2 starting with Cycle 1 due to anticipated tolerance   Irinotecan 180mg/m2 IV over 90 min on day 1 Isa LO reduced dose to 150 mg/m2 starting with Cycle 1 due to anticipated tolerance   Leucovorin 400mg/m2 IV over 90 min concurrent with irinotecan on day 1 Isa LO deleted leucovorin starting with C1 due to anticipated tolerance.   Fluorouracil 400mg/m2 IVP on day 1  Isa LO deleted fluorouracil bolus starting with C1 due to anticipated tolerance.   Fluorouracil 1200ng/m2 CIVI over 24 hrs on days 1 and 2(2400mg/m2 IV over 46 hours)  NCCN Guidelines for Pancreatic Adenocarcinoma V.1.2015  Yoel COVARRUBIAS, et al - N Engl J Med. 2011 May 12;364(19):1812-25. doi: 10.1056/[id number].    /68 mmHg  Pulse 91  Temp(Src) 37 °C (98.6 °F)  Resp 18  Ht 1.854 m (6' 1\")  Wt 136.3 kg (300 lb 7.8 oz)  BMI 39.65 kg/m2  SpO2 94% Body surface area is 2.65 meters squared.    ANC ~ 9030     Plt = 160 k Hgb = 12.8       Scr =  0.54      Crcl ~>125 ml/min      LFTs: AST/ALT/AP = 93/63/736 TBili = 3.3   Labs reviewed with Isa LO, OK to treat with dose reductions as ordered. Follow up CMP in one week.      Oxaliplatin 70 mg/m2 x 2.65 m2 = 185.5 mg   <5% difference, OK to treat with final dose = 185.5 mg IV    Irinotecan 150 mg/m2 x 2.65 m2 = 398 mg   <5% difference, OK to treat with final dose = 397.6 mg IV    Fluorouracil 2400 mg/m2 x 2.65 m2 = 6360 mg   <5% difference, OK to treat with final dose = 6360 mg CIVI over 46 hours  Via home infusion pump at 2.8 ml/hr    Jose Salinas PharmD    "

## 2017-06-20 NOTE — ADDENDUM NOTE
Encounter addended by: Jyoti Puckett on: 6/20/2017  1:03 PM<BR>     Documentation filed: Clinical Notes

## 2017-06-20 NOTE — PROGRESS NOTES
Pt presents to infusion center for cycle 1 chemotherapy as treatment for pancreatic cancer.  Wife present t/o treatment today.  Pt with port; EMLA in place on arrival.  Incision site healing well; area bruised.  Port accessed in sterile fashion, labs drawn as ordered.  Results reviewed; MD notified of elevated T bili and liver enzymes.  MD spoke with Wang, pharmacy about reducing Irinotecan dose (see pharmacy progress notes).  Premeds given as ordered.  Pt tolerated all infusions without incident.  Atropine given prior to Irinotecan infusion.  Pt connected to home pump; 2 chemo RN's verified dose and settings.  Pt left infusion center ambulatory and in good condition.  Returns Wednesday, appointment confirmed.

## 2017-06-20 NOTE — PATHOLOGY
DOCUMENTATION QUERY    PROVIDERS: Please select “Cosign w/ note”to reply to query.    To better represent the severity of illness of your patient, please review the following information and exercise your independent professional judgment in responding to this query.     • Primary Malignant Tumor  • Malignant Secondary Tumor  • Benign Tumor  • Ca in situ  • Uncertain Behavior  • Unspecified Behavior  • Unable to Determine    Other explanation of clinical findings is documented in the Pathology Reports. Based upon the clinical findings, risk factors, and treatment, can this diagnosis be further specified?            Thank you,   Jyoti Puckett

## 2017-06-21 ENCOUNTER — OUTPATIENT INFUSION SERVICES (OUTPATIENT)
Dept: ONCOLOGY | Facility: MEDICAL CENTER | Age: 52
End: 2017-06-21
Attending: INTERNAL MEDICINE
Payer: COMMERCIAL

## 2017-06-21 VITALS
OXYGEN SATURATION: 98 % | HEART RATE: 100 BPM | WEIGHT: 293.21 LBS | TEMPERATURE: 98.1 F | HEIGHT: 73 IN | BODY MASS INDEX: 38.86 KG/M2 | DIASTOLIC BLOOD PRESSURE: 56 MMHG | RESPIRATION RATE: 18 BRPM | SYSTOLIC BLOOD PRESSURE: 130 MMHG

## 2017-06-21 DIAGNOSIS — C25.9 PANCREATIC CANCER METASTASIZED TO LIVER (HCC): ICD-10-CM

## 2017-06-21 DIAGNOSIS — C78.7 PANCREATIC CANCER METASTASIZED TO LIVER (HCC): ICD-10-CM

## 2017-06-21 PROCEDURE — 700111 HCHG RX REV CODE 636 W/ 250 OVERRIDE (IP): Performed by: INTERNAL MEDICINE

## 2017-06-21 PROCEDURE — 306780 HCHG STAT FOR TRANSFUSION PER CASE

## 2017-06-21 PROCEDURE — 96523 IRRIG DRUG DELIVERY DEVICE: CPT

## 2017-06-21 RX ORDER — INSULIN GLARGINE 100 [IU]/ML
25 INJECTION, SOLUTION SUBCUTANEOUS NIGHTLY
COMMUNITY

## 2017-06-21 RX ADMIN — HEPARIN 500 UNITS: 100 SYRINGE at 16:28

## 2017-06-21 ASSESSMENT — PAIN SCALES - GENERAL: PAINLEVEL: 4=SLIGHT-MODERATE PAIN

## 2017-06-22 ENCOUNTER — PATIENT OUTREACH (OUTPATIENT)
Dept: OTHER | Facility: MEDICAL CENTER | Age: 52
End: 2017-06-22

## 2017-06-22 ENCOUNTER — TELEPHONE (OUTPATIENT)
Dept: HEMATOLOGY ONCOLOGY | Facility: MEDICAL CENTER | Age: 52
End: 2017-06-22

## 2017-06-22 NOTE — TELEPHONE ENCOUNTER
Informed patient's wife I was not able to get a hold of the INfusion center at [facility]

## 2017-06-22 NOTE — TELEPHONE ENCOUNTER
Pt's significant other, Hodan called clinic to report ongoing symptoms.  She was not present with patient yesterday at infusion services, so it is difficult to assess whether symptoms are worse or just the same.  Hodan reports patient is having blurry vision, periods of difficulty putting sentences together, unsteadiness, as well as very poor appetite.  Discussed with ADOLFO Norton.  The patient is being scheduled for hydration at [facility] today.  If symptoms persist after hydration, pt should report to ER.  Informed Hodan that we will call her when we have a known time for hydration today.  Orders are being faxed.  Hodan verbalizes agreement with plan.

## 2017-06-22 NOTE — PROGRESS NOTES
"Patient arrived to Infusion for CADD pump de-access; pt unsteady on feet, reports feeling \"dizzy\" and \"like I'm drunk.\"  Pt does not state any one specific complaint or pain, just that he feels \"out of it.\"  Full assessment completed; noted increased R leg swelling, and significant weight loss (6lbs) from 6/19/17.  Pt reports not being able to drink or eat very well thru chemo treatment last three days.  Noted pt diabetic; pt states his blood sugars have been ok for him--this morning approx 206.  Updated med rec with home diabetic medications per pt.  Pt resting, given juice and snacks.  CADD pump disconnected, verified 0ml remaining, 128.4ml delivered.  Port flushed per protocol, de-accessed and gauze dressing applied.  Spoke to AKIRA Moody regarding pt status.  Reviewed current concerns, agree to allow pt to go home with ride today.  Educated pt on going to ER with any worsening symptoms, including any s/s jaundice.  Educated pt on s/s jaundice. Heidy will be having office coordinate hydration infusion for pt in [place] Thursday/Friday this week.  Pt to return Monday for labs/tox check with [facility] as well.  Pt notified and in agreement with plan of care.  Lavern, pt's Nurse Navigator at chairside as well with patient.  Lavern to f/u with patient tomorrow as well.  Pt assisted to lobby by this RN and Lavern; notified pt's brother he may need some more to eat drink on way home.  Pt and brother both agreed.  Pt discharged home in improved spirits under no acute distress.   "

## 2017-06-23 ENCOUNTER — HOSPITAL ENCOUNTER (INPATIENT)
Facility: MEDICAL CENTER | Age: 52
LOS: 3 days | DRG: 444 | End: 2017-06-26
Attending: EMERGENCY MEDICINE | Admitting: INTERNAL MEDICINE
Payer: COMMERCIAL

## 2017-06-23 ENCOUNTER — APPOINTMENT (OUTPATIENT)
Dept: RADIOLOGY | Facility: MEDICAL CENTER | Age: 52
DRG: 444 | End: 2017-06-23
Attending: EMERGENCY MEDICINE
Payer: COMMERCIAL

## 2017-06-23 ENCOUNTER — RESOLUTE PROFESSIONAL BILLING HOSPITAL PROF FEE (OUTPATIENT)
Dept: HOSPITALIST | Facility: MEDICAL CENTER | Age: 52
End: 2017-06-23
Payer: COMMERCIAL

## 2017-06-23 ENCOUNTER — APPOINTMENT (OUTPATIENT)
Dept: RADIOLOGY | Facility: MEDICAL CENTER | Age: 52
DRG: 444 | End: 2017-06-23
Attending: INTERNAL MEDICINE
Payer: COMMERCIAL

## 2017-06-23 DIAGNOSIS — C25.1 MALIGNANT NEOPLASM OF BODY OF PANCREAS (HCC): ICD-10-CM

## 2017-06-23 LAB
ALBUMIN SERPL BCP-MCNC: 3.3 G/DL (ref 3.2–4.9)
ALBUMIN/GLOB SERPL: 1 G/DL
ALP SERPL-CCNC: 907 U/L (ref 30–99)
ALT SERPL-CCNC: 250 U/L (ref 2–50)
AMMONIA PLAS-SCNC: 57 UMOL/L (ref 11–45)
ANION GAP SERPL CALC-SCNC: 22 MMOL/L (ref 0–11.9)
APPEARANCE UR: CLEAR
AST SERPL-CCNC: 168 U/L (ref 12–45)
BASOPHILS # BLD AUTO: 0.2 % (ref 0–1.8)
BASOPHILS # BLD: 0.02 K/UL (ref 0–0.12)
BILIRUB SERPL-MCNC: 6.1 MG/DL (ref 0.1–1.5)
BILIRUB UR QL CFM: POSITIVE
BUN SERPL-MCNC: 29 MG/DL (ref 8–22)
CALCIUM SERPL-MCNC: 9.3 MG/DL (ref 8.5–10.5)
CHLORIDE SERPL-SCNC: 102 MMOL/L (ref 96–112)
CO2 SERPL-SCNC: 17 MMOL/L (ref 20–33)
COLOR UR: ABNORMAL
CREAT SERPL-MCNC: 0.73 MG/DL (ref 0.5–1.4)
EKG IMPRESSION: NORMAL
EOSINOPHIL # BLD AUTO: 0.03 K/UL (ref 0–0.51)
EOSINOPHIL NFR BLD: 0.2 % (ref 0–6.9)
ERYTHROCYTE [DISTWIDTH] IN BLOOD BY AUTOMATED COUNT: 48.1 FL (ref 35.9–50)
GFR SERPL CREATININE-BSD FRML MDRD: >60 ML/MIN/1.73 M 2
GLOBULIN SER CALC-MCNC: 3.2 G/DL (ref 1.9–3.5)
GLUCOSE BLD-MCNC: 138 MG/DL (ref 65–99)
GLUCOSE BLD-MCNC: 307 MG/DL (ref 65–99)
GLUCOSE SERPL-MCNC: 350 MG/DL (ref 65–99)
GLUCOSE UR STRIP.AUTO-MCNC: >1000 MG/DL
HCT VFR BLD AUTO: 42.4 % (ref 42–52)
HGB BLD-MCNC: 13.8 G/DL (ref 14–18)
IMM GRANULOCYTES # BLD AUTO: 0.08 K/UL (ref 0–0.11)
IMM GRANULOCYTES NFR BLD AUTO: 0.6 % (ref 0–0.9)
INR PPP: 1.37 (ref 0.87–1.13)
KETONES UR STRIP.AUTO-MCNC: 100 MG/DL
LACTATE BLD-SCNC: 2 MMOL/L (ref 0.5–2)
LACTATE BLD-SCNC: 2.9 MMOL/L (ref 0.5–2)
LEUKOCYTE ESTERASE UR QL STRIP.AUTO: NEGATIVE
LYMPHOCYTES # BLD AUTO: 0.23 K/UL (ref 1–4.8)
LYMPHOCYTES NFR BLD: 1.7 % (ref 22–41)
MCH RBC QN AUTO: 29.4 PG (ref 27–33)
MCHC RBC AUTO-ENTMCNC: 32.5 G/DL (ref 33.7–35.3)
MCV RBC AUTO: 90.4 FL (ref 81.4–97.8)
MICRO URNS: ABNORMAL
MONOCYTES # BLD AUTO: 0.22 K/UL (ref 0–0.85)
MONOCYTES NFR BLD AUTO: 1.7 % (ref 0–13.4)
NEUTROPHILS # BLD AUTO: 12.72 K/UL (ref 1.82–7.42)
NEUTROPHILS NFR BLD: 95.6 % (ref 44–72)
NITRITE UR QL STRIP.AUTO: NEGATIVE
NRBC # BLD AUTO: 0 K/UL
NRBC BLD AUTO-RTO: 0 /100 WBC
PH UR STRIP.AUTO: 5.5 [PH]
PLATELET # BLD AUTO: 105 K/UL (ref 164–446)
PMV BLD AUTO: 11 FL (ref 9–12.9)
POTASSIUM SERPL-SCNC: 4.2 MMOL/L (ref 3.6–5.5)
PROT SERPL-MCNC: 6.5 G/DL (ref 6–8.2)
PROT UR QL STRIP: NEGATIVE MG/DL
PROTHROMBIN TIME: 17.3 SEC (ref 12–14.6)
RBC # BLD AUTO: 4.69 M/UL (ref 4.7–6.1)
RBC UR QL AUTO: NEGATIVE
SODIUM SERPL-SCNC: 141 MMOL/L (ref 135–145)
SP GR UR STRIP.AUTO: >1.035
WBC # BLD AUTO: 13.3 K/UL (ref 4.8–10.8)

## 2017-06-23 PROCEDURE — 81003 URINALYSIS AUTO W/O SCOPE: CPT

## 2017-06-23 PROCEDURE — 74177 CT ABD & PELVIS W/CONTRAST: CPT

## 2017-06-23 PROCEDURE — 83605 ASSAY OF LACTIC ACID: CPT | Mod: 91

## 2017-06-23 PROCEDURE — 71010 DX-CHEST-PORTABLE (1 VIEW): CPT

## 2017-06-23 PROCEDURE — 76705 ECHO EXAM OF ABDOMEN: CPT

## 2017-06-23 PROCEDURE — 85025 COMPLETE CBC W/AUTO DIFF WBC: CPT

## 2017-06-23 PROCEDURE — 80053 COMPREHEN METABOLIC PANEL: CPT

## 2017-06-23 PROCEDURE — 87086 URINE CULTURE/COLONY COUNT: CPT

## 2017-06-23 PROCEDURE — 87040 BLOOD CULTURE FOR BACTERIA: CPT

## 2017-06-23 PROCEDURE — 36415 COLL VENOUS BLD VENIPUNCTURE: CPT

## 2017-06-23 PROCEDURE — 85610 PROTHROMBIN TIME: CPT

## 2017-06-23 PROCEDURE — 82962 GLUCOSE BLOOD TEST: CPT | Mod: 91

## 2017-06-23 PROCEDURE — 700105 HCHG RX REV CODE 258: Performed by: INTERNAL MEDICINE

## 2017-06-23 PROCEDURE — 700111 HCHG RX REV CODE 636 W/ 250 OVERRIDE (IP): Performed by: INTERNAL MEDICINE

## 2017-06-23 PROCEDURE — 770009 HCHG ROOM/CARE - ONCOLOGY SEMI PRI*

## 2017-06-23 PROCEDURE — 700102 HCHG RX REV CODE 250 W/ 637 OVERRIDE(OP): Performed by: INTERNAL MEDICINE

## 2017-06-23 PROCEDURE — 82140 ASSAY OF AMMONIA: CPT

## 2017-06-23 PROCEDURE — 99285 EMERGENCY DEPT VISIT HI MDM: CPT

## 2017-06-23 PROCEDURE — 93005 ELECTROCARDIOGRAM TRACING: CPT

## 2017-06-23 PROCEDURE — 700105 HCHG RX REV CODE 258: Performed by: EMERGENCY MEDICINE

## 2017-06-23 PROCEDURE — 302128 INFUSION PUMP: Performed by: INTERNAL MEDICINE

## 2017-06-23 PROCEDURE — 99223 1ST HOSP IP/OBS HIGH 75: CPT | Performed by: INTERNAL MEDICINE

## 2017-06-23 PROCEDURE — 96361 HYDRATE IV INFUSION ADD-ON: CPT

## 2017-06-23 PROCEDURE — 96365 THER/PROPH/DIAG IV INF INIT: CPT

## 2017-06-23 PROCEDURE — 700117 HCHG RX CONTRAST REV CODE 255: Performed by: EMERGENCY MEDICINE

## 2017-06-23 PROCEDURE — A9270 NON-COVERED ITEM OR SERVICE: HCPCS | Performed by: INTERNAL MEDICINE

## 2017-06-23 PROCEDURE — 700111 HCHG RX REV CODE 636 W/ 250 OVERRIDE (IP): Performed by: EMERGENCY MEDICINE

## 2017-06-23 RX ORDER — OXYCODONE HYDROCHLORIDE 10 MG/1
10 TABLET ORAL EVERY 4 HOURS PRN
Status: DISCONTINUED | OUTPATIENT
Start: 2017-06-23 | End: 2017-06-26 | Stop reason: HOSPADM

## 2017-06-23 RX ORDER — SODIUM CHLORIDE 9 MG/ML
INJECTION, SOLUTION INTRAVENOUS CONTINUOUS
Status: ACTIVE | OUTPATIENT
Start: 2017-06-23 | End: 2017-06-24

## 2017-06-23 RX ORDER — ONDANSETRON 4 MG/1
4 TABLET, ORALLY DISINTEGRATING ORAL EVERY 4 HOURS PRN
Status: DISCONTINUED | OUTPATIENT
Start: 2017-06-23 | End: 2017-06-26 | Stop reason: HOSPADM

## 2017-06-23 RX ORDER — LACTULOSE 20 G/30ML
30 SOLUTION ORAL 3 TIMES DAILY
Status: DISCONTINUED | OUTPATIENT
Start: 2017-06-23 | End: 2017-06-26

## 2017-06-23 RX ORDER — ZINC SULFATE 50(220)MG
220 CAPSULE ORAL DAILY
Status: DISCONTINUED | OUTPATIENT
Start: 2017-06-23 | End: 2017-06-26 | Stop reason: HOSPADM

## 2017-06-23 RX ORDER — SODIUM CHLORIDE 9 MG/ML
30 INJECTION, SOLUTION INTRAVENOUS
Status: COMPLETED | OUTPATIENT
Start: 2017-06-23 | End: 2017-06-23

## 2017-06-23 RX ORDER — BISACODYL 10 MG
10 SUPPOSITORY, RECTAL RECTAL
Status: DISCONTINUED | OUTPATIENT
Start: 2017-06-23 | End: 2017-06-26 | Stop reason: HOSPADM

## 2017-06-23 RX ORDER — ONDANSETRON 2 MG/ML
4 INJECTION INTRAMUSCULAR; INTRAVENOUS EVERY 4 HOURS PRN
Status: DISCONTINUED | OUTPATIENT
Start: 2017-06-23 | End: 2017-06-26 | Stop reason: HOSPADM

## 2017-06-23 RX ORDER — FUROSEMIDE 40 MG/1
40 TABLET ORAL 2 TIMES DAILY
COMMUNITY

## 2017-06-23 RX ORDER — PROMETHAZINE HYDROCHLORIDE 25 MG/1
12.5-25 SUPPOSITORY RECTAL EVERY 4 HOURS PRN
Status: DISCONTINUED | OUTPATIENT
Start: 2017-06-23 | End: 2017-06-26 | Stop reason: HOSPADM

## 2017-06-23 RX ORDER — GLIMEPIRIDE 4 MG/1
4 TABLET ORAL 2 TIMES DAILY
COMMUNITY

## 2017-06-23 RX ORDER — PROMETHAZINE HYDROCHLORIDE 25 MG/1
12.5-25 TABLET ORAL EVERY 4 HOURS PRN
Status: DISCONTINUED | OUTPATIENT
Start: 2017-06-23 | End: 2017-06-26 | Stop reason: HOSPADM

## 2017-06-23 RX ORDER — POTASSIUM CHLORIDE 20 MEQ/1
20 TABLET, EXTENDED RELEASE ORAL DAILY
COMMUNITY

## 2017-06-23 RX ORDER — NIACIN 500 MG
500 TABLET ORAL 2 TIMES DAILY
COMMUNITY

## 2017-06-23 RX ORDER — DEXTROSE MONOHYDRATE 25 G/50ML
25 INJECTION, SOLUTION INTRAVENOUS
Status: DISCONTINUED | OUTPATIENT
Start: 2017-06-23 | End: 2017-06-26 | Stop reason: HOSPADM

## 2017-06-23 RX ORDER — TRAMADOL HYDROCHLORIDE 50 MG/1
50-100 TABLET ORAL EVERY 6 HOURS PRN
Status: ON HOLD | COMMUNITY
End: 2017-07-03

## 2017-06-23 RX ORDER — CEFTRIAXONE 2 G/1
2 INJECTION, POWDER, FOR SOLUTION INTRAMUSCULAR; INTRAVENOUS ONCE
Status: COMPLETED | OUTPATIENT
Start: 2017-06-23 | End: 2017-06-23

## 2017-06-23 RX ORDER — AMOXICILLIN 250 MG
2 CAPSULE ORAL 2 TIMES DAILY
Status: DISCONTINUED | OUTPATIENT
Start: 2017-06-23 | End: 2017-06-26 | Stop reason: HOSPADM

## 2017-06-23 RX ORDER — M-VIT,TX,IRON,MINS/CALC/FOLIC 27MG-0.4MG
1 TABLET ORAL DAILY
COMMUNITY

## 2017-06-23 RX ORDER — POLYETHYLENE GLYCOL 3350 17 G/17G
1 POWDER, FOR SOLUTION ORAL
Status: DISCONTINUED | OUTPATIENT
Start: 2017-06-23 | End: 2017-06-26 | Stop reason: HOSPADM

## 2017-06-23 RX ADMIN — Medication 220 MG: at 16:31

## 2017-06-23 RX ADMIN — SODIUM CHLORIDE 1000 ML: 9 INJECTION, SOLUTION INTRAVENOUS at 10:47

## 2017-06-23 RX ADMIN — CEFTRIAXONE SODIUM 2 G: 2 INJECTION, POWDER, FOR SOLUTION INTRAMUSCULAR; INTRAVENOUS at 16:30

## 2017-06-23 RX ADMIN — LACTULOSE 30 ML: 20 SOLUTION ORAL at 16:31

## 2017-06-23 RX ADMIN — SODIUM CHLORIDE: 9 INJECTION, SOLUTION INTRAVENOUS at 13:47

## 2017-06-23 RX ADMIN — CEFTRIAXONE SODIUM 2 G: 2 INJECTION, POWDER, FOR SOLUTION INTRAMUSCULAR; INTRAVENOUS at 10:47

## 2017-06-23 RX ADMIN — IOHEXOL 100 ML: 350 INJECTION, SOLUTION INTRAVENOUS at 12:45

## 2017-06-23 RX ADMIN — INSULIN LISPRO 4 UNITS: 100 INJECTION, SOLUTION INTRAVENOUS; SUBCUTANEOUS at 20:50

## 2017-06-23 RX ADMIN — LACTULOSE 30 ML: 20 SOLUTION ORAL at 20:50

## 2017-06-23 ASSESSMENT — LIFESTYLE VARIABLES
EVER_SMOKED: YES
ALCOHOL_USE: NO

## 2017-06-23 ASSESSMENT — PAIN SCALES - GENERAL: PAINLEVEL_OUTOF10: 0

## 2017-06-23 NOTE — ED PROVIDER NOTES
"ED Provider Note    CHIEF COMPLAINT  Chief Complaint   Patient presents with   • Sent by MD   . Altered Mental status, abdominal pain    HPI  Errol Sauceda is a 52 y.o. male who presents with altered mental status and abdominal pain. He was recently diagnosed with metastatic pancreatic cancer roughly 2 weeks ago. He is getting chemotherapy for palliative treatment. He was also diagnosed with DVT. He is now having worsening abdominal pain, lethargy, and increasing jaundice. Symptoms have been getting progressively worse over the past couple days    REVIEW OF SYSTEMS  See HPI for further details.  No cough or cold symptoms. No vomiting or diarrhea. Positive altered mental status. All other systems are negative.    PAST MEDICAL HISTORY  Past Medical History   Diagnosis Date   • Diabetes (CMS-HCC)        FAMILY HISTORY  No family history on file.    SOCIAL HISTORY  Social History     Social History   • Marital Status: Single     Spouse Name: N/A   • Number of Children: N/A   • Years of Education: N/A     Social History Main Topics   • Smoking status: Former Smoker     Types: Cigarettes     Quit date: 06/14/2005   • Smokeless tobacco: Never Used   • Alcohol Use: None   • Drug Use: No   • Sexual Activity: Not Asked     Other Topics Concern   • None     Social History Narrative       SURGICAL HISTORY  History reviewed. No pertinent past surgical history.    CURRENT MEDICATIONS  @ He is Wilfredo@    ALLERGIES  No Known Allergies    PHYSICAL EXAM  VITAL SIGNS: /76 mmHg  Pulse 86  Temp(Src) 36.7 °C (98 °F)  Resp 15  Ht 1.854 m (6' 1\")  Wt 128 kg (282 lb 3 oz)  BMI 37.24 kg/m2  SpO2 97%  Constitutional: Middle-aged male. Lethargic. Sleeping. Awakens to verbal stimuli  HENT: Normocephalic, Atraumatic, Bilateral external ears normal, Oropharynx dry  Eyes: Jaundiced  Neck: Normal range of motion, No tenderness, Supple, No stridor. No nuchal rigidity  Lymphatic: No lymphadenopathy noted.   Cardiovascular: Normal heart " rate, Normal rhythm, No murmurs, No rubs, No gallops.   Thorax & Lungs: Normal breath sounds, No respiratory distress, No wheezing, No chest tenderness.  Abdomen: Normal bowel sounds. Obese. Soft  Musculoskeletal: Swollen legs bilaterally  Skin: Warm, Dry, No erythema, No rash. Jaundiced  Back: No tenderness, No CVA tenderness.   Extremities: No edema, No tenderness, No cyanosis, No clubbing. Dorsalis pedis pulses 2+ equal bilaterally. Radial pulses 2+ equal bilaterally    RADIOLOGY/PROCEDURES    CT-ABDOMEN-PELVIS WITH   Final Result         1. Worsening appearance of multiple liver metastasis.      2. Unchanged nonocclusive thrombus in the portal confluent.      3. New ill-defined low-attenuation area in the posterior spleen and bilateral kidneys. There is a nonspecific and could relate to new metastases or areas of infarct.      4. Nonspecific 3 cm low-attenuation right adrenal nodule is unchanged.      5. Grossly unchanged appearance of the low-attenuation pancreatic mass.      6. Distended gallbladder with apparent wall thickening, nonspecific and could be seen with liver disease. Cholecystitis cannot be excluded. Correlate with ultrasound.      7. Partially visualized lower lung pulmonary emboli, as seen on prior exam. Patchy peripheral bibasilar opacities could relate to infarct.      DX-CHEST-PORTABLE (1 VIEW)   Final Result         1. No acute cardiopulmonary abnormalities are identified.          Results for orders placed or performed during the hospital encounter of 06/23/17   Lactic acid (lactate)   Result Value Ref Range    Lactic Acid 2.9 (H) 0.5 - 2.0 mmol/L   Lactic acid (lactate)   Result Value Ref Range    Lactic Acid 2.0 0.5 - 2.0 mmol/L   CBC WITH DIFFERENTIAL   Result Value Ref Range    WBC 13.3 (H) 4.8 - 10.8 K/uL    RBC 4.69 (L) 4.70 - 6.10 M/uL    Hemoglobin 13.8 (L) 14.0 - 18.0 g/dL    Hematocrit 42.4 42.0 - 52.0 %    MCV 90.4 81.4 - 97.8 fL    MCH 29.4 27.0 - 33.0 pg    MCHC 32.5 (L) 33.7 -  35.3 g/dL    RDW 48.1 35.9 - 50.0 fL    Platelet Count 105 (L) 164 - 446 K/uL    MPV 11.0 9.0 - 12.9 fL    Neutrophils-Polys 95.60 (H) 44.00 - 72.00 %    Lymphocytes 1.70 (L) 22.00 - 41.00 %    Monocytes 1.70 0.00 - 13.40 %    Eosinophils 0.20 0.00 - 6.90 %    Basophils 0.20 0.00 - 1.80 %    Immature Granulocytes 0.60 0.00 - 0.90 %    Nucleated RBC 0.00 /100 WBC    Neutrophils (Absolute) 12.72 (H) 1.82 - 7.42 K/uL    Lymphs (Absolute) 0.23 (L) 1.00 - 4.80 K/uL    Monos (Absolute) 0.22 0.00 - 0.85 K/uL    Eos (Absolute) 0.03 0.00 - 0.51 K/uL    Baso (Absolute) 0.02 0.00 - 0.12 K/uL    Immature Granulocytes (abs) 0.08 0.00 - 0.11 K/uL    NRBC (Absolute) 0.00 K/uL   COMP METABOLIC PANEL   Result Value Ref Range    Sodium 141 135 - 145 mmol/L    Potassium 4.2 3.6 - 5.5 mmol/L    Chloride 102 96 - 112 mmol/L    Co2 17 (L) 20 - 33 mmol/L    Anion Gap 22.0 (H) 0.0 - 11.9    Glucose 350 (H) 65 - 99 mg/dL    Bun 29 (H) 8 - 22 mg/dL    Creatinine 0.73 0.50 - 1.40 mg/dL    Calcium 9.3 8.5 - 10.5 mg/dL    AST(SGOT) 168 (H) 12 - 45 U/L    ALT(SGPT) 250 (H) 2 - 50 U/L    Alkaline Phosphatase 907 (H) 30 - 99 U/L    Total Bilirubin 6.1 (H) 0.1 - 1.5 mg/dL    Albumin 3.3 3.2 - 4.9 g/dL    Total Protein 6.5 6.0 - 8.2 g/dL    Globulin 3.2 1.9 - 3.5 g/dL    A-G Ratio 1.0 g/dL   URINALYSIS   Result Value Ref Range    Micro Urine Req see below     Color Marcella     Character Clear     Specific Gravity >1.035 (A) <1.035    Ph 5.5 5.0-8.0    Glucose >1000 (A) Negative mg/dL    Ketones 100 (A) Negative mg/dL    Protein Negative Negative mg/dL    Nitrite Negative Negative    Leukocyte Esterase Negative Negative    Occult Blood Negative Negative   AMMONIA   Result Value Ref Range    Ammonia 57 (H) 11 - 45 umol/L   UR BILI ICTOTEST   Result Value Ref Range    Bilirubin Positive Negative   ESTIMATED GFR   Result Value Ref Range    GFR If African American >60 >60 mL/min/1.73 m 2    GFR If Non African American >60 >60 mL/min/1.73 m 2   PROTHROMBIN  TIME   Result Value Ref Range    PT 17.3 (H) 12.0 - 14.6 sec    INR 1.37 (H) 0.87 - 1.13   EKG (ER)   Result Value Ref Range    Report       [facility] Emergency Dept.    Test Date:  2017  Pt Name:    YOSELIN HARRIS              Department: ER  MRN:        [id number]                      Room:       [facility]  Gender:     M                            Technician: [id number]  :        1965                   Requested By:ER TRIAGE PROTOCOL  Order #:    [id number]                    Reading MD:    Measurements  Intervals                                Axis  Rate:       115                          P:          59  ID:         [id number]                          QRS:        47  QRSD:       108                          T:          -22  QT:         340  QTc:        471    Interpretive Statements  SINUS TACHYCARDIA  PROBABLE INFERIOR INFARCT, AGE INDETERMINATE  No previous ECG available for comparison           COURSE & MEDICAL DECISION MAKING  Pertinent Labs & Imaging studies reviewed. (See chart for details)  GI consultation will be obtained. Patient will be admitted to the hospitalist. Patient has symptoms concerning for hepatic failure and worsening metastases. It's unclear whether a stent would be helpful in this case. Patient was given IV fluids and antibiotics. His INR was elevated at 1.4. His bilirubin was 6. His white count was 13,000 and an initial lactic acid was 2.9. He is given IV fluids but not 30 mL/kg given his bipedal edema and the fact that his lactic acid normalize. His ammonia was slightly elevated    FINAL IMPRESSION  1. Acute abdominal pain  2. Metastatic pancreatic cancer  3. Dehydration    Please note that this dictation was created using voice recognition software. I have worked with consultants from the vendor as well as technical experts from [facility] to optimize the interface. I have made every reasonable attempt to correct obvious errors, but I expect that there are errors of  grammar and possibly content that I did not discover before finalizing the note.    Electronically signed by: Geovanny Stoddard, 6/23/2017 1:52 PM

## 2017-06-23 NOTE — PROGRESS NOTES
Gi consult dictated:  Impression:  1. Malaise and fatigue  2. Jaundice-ducts not really obstructed on CT so may be due to burden of hepatic mets  3. Metastatic pancreatic ca  4. Weight loss  5. Distended GB ? Cholecystitis but exam is benign.  6. Hx DVT/PE now with new renal and splenic lesions that may be embolic  7. DM  8. Obesity s/p gastric sleeve resection 2013  9. Hyperlipidemia  10. HTN  11. Portal vein thrombosis.   12. Lactic acidosis.   Plan:  1. Agree with antibiotic coverage  2. Fluid resuscitation.  3. Clear liquids ok  4. US GB  5. At this point he does not have intrahepatic ductal dilation on CT per my review with the radiologist. Unclear if jaundice is due to obstruction or large tumor burden in liver but I suspect it is the latter. Will assess for ERCP and stenting if indicated but at least at this point his intrahepatics are not dilated.

## 2017-06-23 NOTE — ED NOTES
Pt sent by MD. Pt is being treated for pancreatic and liver CA. Pt was told his bilirubin levels are elevated. Pt c/o cp/sob. EKG paged.

## 2017-06-23 NOTE — PROGRESS NOTES
Assessment completed.  Pt AAOx4.  Pt has no comlaints of pain at this time.  No other s/s of discomfort or distress. Pt ambulates with standby assist.  Bed in lowest position, locked, and bed alarm is on.  Pt calls appropriately.  Treaded socks in place, call light within reach and staff numbers provided.  Pt needs met at this time.

## 2017-06-23 NOTE — IP AVS SNAPSHOT
" <p align=\"LEFT\"><IMG SRC=\"//[email or web address]$/[email or web address]\" alt=\"Image\" WIDTH=\"50%\" HEIGHT=\"200\" BORDER=\"\"></p>                   Name:Errol Sauceda  Medical Record Number:[id number]  CSN: [id number]    YOB: 1965   Age: 52 y.o.  Sex: male  HT:1.854 m (6' 1\") WT: 128 kg (282 lb 3 oz)          Admit Date: 6/23/2017     Discharge Date:   Today's Date: 6/26/2017  Attending Doctor:  Alis Christian D.O.                  Allergies:  Review of patient's allergies indicates no known allergies.          Your appointments     Jun 26, 2017  1:30 PM   ONCOLOGY EST PATIENT 30 MIN with ELIJAH Watkins.P.NJaci   [facility] (--)    [address], [facility] [address]  [place] [address]   [phone]            Jul 03, 2017  8:30 AM   Non Provider 1 with ONC RN 2   [facility] (--)    [address], [facility] [address]  [place] [address]   [phone]           You will be receiving a confirmation call a few days before your appointment from our automated call confirmation system.            Jul 03, 2017  9:00 AM   ONCOLOGY EST PATIENT 30 MIN with ELIJAH Watkins.P.NJaci   [facility] (--)    [address], [facility] [address]  [place] [address]   [phone]            Jul 03, 2017 11:00 AM   Est Chemo 3 with RN 2   [facility] ([facility])    [address] [address]  [place] [address]   [phone]            Jul 05, 2017  5:00 PM   EST Port Flush / Central Line Care with INFUSION QUICK INJECT   [facility] ([facility])    [address]  [place] [address]   [phone]            Jul 17, 2017  8:30 AM   Non Provider 1 with ONC MA 1   [facility] (--)    [address], [facility] [address]  [place] [address]   [phone]           You will be receiving a confirmation call a few days before your appointment from our automated call confirmation system.            Jul 17, 2017 10:00 AM   ONCOLOGY EST PATIENT 30 MIN with SLIM WatkinsPDANITA   [facility] (--)    [address]  [facility] [address]   [phone]            Jul 17, 2017 11:00 AM   Est Chemo 3 with RN 2   [facility] ([facility])    [address]  [facility] [address]   [phone]            Jul 19, 2017 11:30 AM   EST Port Flush / Central Line Care with RN 5   [facility] ([facility])    [address]  [facility] [address]   [phone]            Jul 31, 2017  8:30 AM   Non Provider 1 with ONC RN 1   [facility] (--)    [address]  [facility] [address]   [phone]           You will be receiving a confirmation call a few days before your appointment from our automated call confirmation system.            Jul 31, 2017 10:00 AM   ONCOLOGY EST PATIENT 30 MIN with Marlon Moss M.D.   [facility] (--)    [address]  [facility] [address]   [phone]            Jul 31, 2017 11:00 AM   Est Chemo 3 with RN 2   [facility] ([facility])    [address]  [facility] [address]   [phone]            Aug 02, 2017 11:30 AM   EST Port Flush / Central Line Care with RN 2   [facility] ([facility])    [address]  [facility] [address]   [phone]              Follow-up Information     1. Follow up with Heidy Norton A.PJaciNJaci.    Specialty:  Oncology    Why:  as scheduled    Contact information    [address]  [facility] [address]  [phone]          2. Follow up with Laboratory near you.    Why:  for labs Wednesday and Friday         Medication List      Take these Medications        Instructions    furosemide 40 MG Tabs   Commonly known as:  LASIX    Take 40 mg by mouth 2 Times a Day.   Dose:  40 mg       glimepiride 4 MG Tabs   Commonly known as:  AMARYL    Take 4 mg by mouth 2 times a day.   Dose:  4 mg       insulin glargine 100 UNIT/ML Soln   Commonly known as:  LANTUS    Inject 20 Units as instructed every evening.   Dose:  20 Units       JARDIANCE 25 MG Tabs   Generic drug:   Empagliflozin    Take 25 mg by mouth every day.   Dose:  25 mg       lidocaine-prilocaine 2.5-2.5 % Crea   Commonly known as:  EMLA    Doctor's comments:  Directions: Apply to port 1 hour prior to access of port and cover with plastic wrap.   Apply to port one hour prior to access and cover with plastic wrap.       loperamide 2 MG tablet   Commonly known as:  IMODIUM A-D    Doctor's comments:  Give loperamide 4 mg orally first dose, then 2 mg orally with every loose bowel movement.  Contact physician if maximum of 16 mg/24 hours is exceeded.   Take 1 Tab by mouth See Admin Instructions.   Dose:  2 mg       metformin 500 MG Tabs   Commonly known as:  GLUCOPHAGE    Take 500 mg by mouth 2 times a day, with meals.   Dose:  500 mg       morphine ER 30 MG Tbcr tablet   Commonly known as:  MS CONTIN    Take 1 Tab by mouth every 12 hours.   Dose:  30 mg       niacin 500 MG Tabs    Take 500 mg by mouth 2 times a day.   Dose:  500 mg       nystatin 841810 UNIT/ML Susp   Commonly known as:  MYCOSTATIN    Take 5 mL by mouth 4 times a day for 14 days.   Dose:  5 mL       ondansetron 4 MG Tabs tablet   Commonly known as:  ZOFRAN    Take 1 Tab by mouth every four hours as needed for Nausea/Vomiting (for nausea, vomiting).   Dose:  4 mg       oxycodone immediate release 10 MG immediate release tablet   Commonly known as:  ROXICODONE    Take 1 Tab by mouth every four hours as needed for Moderate Pain.   Dose:  10 mg       potassium chloride SA 20 MEQ Tbcr   Commonly known as:  Kdur    Take 20 mEq by mouth every day.   Dose:  20 mEq       prochlorperazine 10 MG Tabs   Commonly known as:  COMPAZINE    Take 1 Tab by mouth every 6 hours as needed (for nausea, vomiting).   Dose:  10 mg       therapeutic multivitamin-minerals Tabs    Take 1 Tab by mouth every day.   Dose:  1 Tab       tramadol 50 MG Tabs   Commonly known as:  ULTRAM    Take  mg by mouth every 6 hours as needed.   Dose:   mg       XARELTO 15 MG Tabs tablet      Generic drug:  rivaroxaban    Take 15 mg by mouth 2 Times a Day.   Dose:  15 mg

## 2017-06-23 NOTE — ED NOTES
The Medication Reconciliation process has been completed by interviewing the patient's wife who reports that he is having chemo bi-weekly through [facility].      Allergies have been reviewed  Antibiotic use in 30 days - NONE    Home Pharmacy:  [place] - [facility] , [facility] [place]

## 2017-06-23 NOTE — IP AVS SNAPSHOT
6/26/2017    Errol Sauceda  [id number]  [place] [address]    Dear Errol:    [facility] wants to ensure your discharge home is safe and you or your loved ones have had all of your questions answered regarding your care after you leave the hospital.    Below is a list of resources and contact information should you have any questions regarding your hospital stay, follow-up instructions, or active medical symptoms.    Questions or Concerns Regarding… Contact   Medical Questions Related to Your Discharge  (7 days a week, 8am-5pm) Contact a Nurse Care Coordinator   [phone]   Medical Questions Not Related to Your Discharge  (24 hours a day / 7 days a week)  Contact the Nurse Health Line   [phone]    Medications or Discharge Instructions Refer to your discharge packet   or contact your [facility] Primary Care Provider   [phone]   Follow-up Appointment(s) Schedule your appointment via MYOMO   or contact Scheduling [phone]   Billing Review your statement via MYOMO  or contact Billing [phone]   Medical Records Review your records via MYOMO   or contact Medical Records [phone]     You may receive a telephone call within two days of discharge. This call is to make certain you understand your discharge instructions and have the opportunity to have any questions answered. You can also easily access your medical information, test results and upcoming appointments via the MYOMO free online health management tool. You can learn more and sign up at [email or web address]. For assistance setting up your MYOMO account, please call [phone].    Once again, we want to ensure your discharge home is safe and that you have a clear understanding of any next steps in your care. If you have any questions or concerns, please do not hesitate to contact us, we are here for you. Thank you for choosing [facility] for your healthcare needs.    Sincerely,    Your [facility] Healthcare Team

## 2017-06-23 NOTE — PROGRESS NOTES
Spoke with Hodan, S/O, after infusion.  Hodan stated that blurred vision and dizziness are still present but slightly improved.  Hodan also stated that pt has stopped his oxycodone for breakthrough pain and is relatively pain free.  Hodan stated that one of the main problems is staying awake long enough to eat something.  Hodan was also wondering if pt should continue taking furosemide 40 mg and potassium 20 meq daily.  Hodan stated that his leg swelling is better than yesterday, but the Right leg is still slightly larger than the Left leg.  Hodan also stated that she was given a copy of pt's labs today and bilirubin was 5.3.  This was reported to AKIRA Moody, who plans to have pt contacted again tomorrow.  ellis

## 2017-06-23 NOTE — IP AVS SNAPSHOT
American [employer]Net Access Code: [email or web address]  Expires: 7/12/2017  2:07 PM    American [employer]Net  A secure, online tool to manage your health information     [employer]’s American [employer]® is a secure, online tool that connects you to your personalized health information from the privacy of your home -- day or night - making it very easy for you to manage your healthcare. Once the activation process is completed, you can even access your medical information using the American [employer]Net aj, which is available for free in the Apple Aj store or Google Play store.     [employer] provides the following levels of access (as shown below):   My Chart Features   [facility] Primary Care Doctor [facility]  Specialists [facility]  Urgent  Care Non-[facility]  Primary Care  Doctor   Email your healthcare team securely and privately 24/7 X X X X   Manage appointments: schedule your next appointment; view details of past/upcoming appointments X      Request prescription refills. X      View recent personal medical records, including lab and immunizations X X X X   View health record, including health history, allergies, medications X X X X   Read reports about your outpatient visits, procedures, consult and ER notes X X X X   See your discharge summary, which is a recap of your hospital and/or ER visit that includes your diagnosis, lab results, and care plan. X X       How to register for American [employer]Net:  1. Go to  [email or web address].  2. Click on the Sign Up Now box, which takes you to the New Member Sign Up page. You will need to provide the following information:  a. Enter your American [employer]Net Access Code exactly as it appears at the top of this page. (You will not need to use this code after you’ve completed the sign-up process. If you do not sign up before the expiration date, you must request a new code.)   b. Enter your date of birth.   c. Enter your home email address.   d. Click Submit, and follow the next screen’s instructions.  3. Create a American [employer]Net ID. This will be your [employer]  login ID and cannot be changed, so think of one that is secure and easy to remember.  4. Create a [employer] password. You can change your password at any time.  5. Enter your Password Reset Question and Answer. This can be used at a later time if you forget your password.   6. Enter your e-mail address. This allows you to receive e-mail notifications when new information is available in [employer].  7. Click Sign Up. You can now view your health information.    For assistance activating your [employer] account, call [phone]

## 2017-06-23 NOTE — TELEPHONE ENCOUNTER
I was informed that the patient's total bilirubin went up above 5 . According to the labs from today.    His T bilirubin was 1.7 prior to the chemotherapy and trended up to 3.3 on the day of chemotherapy. He is developing worsening biliary obstruction. Called his wife and instructed her to bring him to the emergency room. He needs to be admitted to the hospital for ERCP and biliary stent placement ASAP.     He appears to be more sedated, probably from the opioids and liver dysfunction. Instructed her to stop MS Contin and use only when necessary oxycodone. He is status post celiac plexus neurolysis with improvement in pain already.    Informed her that we need to get his LFTs fixed ASAP so that the chemotherapy will not cause extra side effects due to reduced hepatic clearance. She will bring him to the [facility] emergency room early in the morning.

## 2017-06-23 NOTE — ED NOTES
Chief Complaint   Patient presents with   • Sent by MD     Agree with triage RN. Pt has yellow sclera, sedate. S/O at bedside assisting with care. PIV established, blood drawn and sent to lab. Urinal at bedside.  Chart up for ERP.

## 2017-06-23 NOTE — IP AVS SNAPSHOT
" Home Care Instructions                                                                                                                  Name:Errol Sauceda  Medical Record Number:[id number]  CSN: [id number]    YOB: 1965   Age: 52 y.o.  Sex: male  HT:1.854 m (6' 1\") WT: 128 kg (282 lb 3 oz)          Admit Date: 6/23/2017     Discharge Date:   Today's Date: 6/26/2017  Attending Doctor:  Alis Christian D.O.                  Allergies:  Review of patient's allergies indicates no known allergies.            Discharge Instructions       Jaundice, Adult  Jaundice is a yellowish discoloration of the skin, the whites of the eyes, and mucous membranes. Jaundice can be a sign that the liver or the bile system is not working normally.  CAUSES  This condition is caused by an increased level of bilirubin in the blood. Bilirubin is a substance that is produced by the normal breakdown of red blood cells. Conditions and activities that can cause an increase in the bilirubin level include:  · Viral hepatitis.  · Gallstones or other conditions, such as a tumor, that can cause a blockage of bile ducts.  · Excessive use of alcohol.  · Other liver diseases, such as cirrhosis.  · Certain cancers.  · Certain infections.  · Certain genetic syndromes.  · Certain medicines.  SYMPTOMS  Symptoms of this condition include:  1. Yellow color to the skin, the whites of the eyes, or mucous membranes.  2. Dark brown urine.  3. Stomach pain.  4. Light or carlos-colored stool.  5. Itchy skin (pruritus).  DIAGNOSIS  This condition is diagnosed with a medical history, physical exam, and blood tests. You may have additional tests to determine what is causing your bilirubin level to increase.  TREATMENT  Treatment for jaundice depends on the underlying condition. Treatment may include:  1. Stopping the use of a certain medicine.  2. Fluids that are given through an IV tube that is inserted into one of your veins.  3. Medicines to treat " pruritus.  4. Surgery, if there is blockage of the bile ducts.  HOME CARE INSTRUCTIONS  1. Drink enough fluid to keep your urine clear or pale yellow.  2. Do not drink alcohol.  3. Take medicines only as directed by your health care provider.  4. Keep all follow-up visits as directed by your health care provider. This is important.  5. You may use skin lotion to relieve itching.  SEEK MEDICAL CARE IF:  1. You have a fever.  SEEK IMMEDIATE MEDICAL CARE IF:  · Your symptoms suddenly get worse.  · You have symptoms for more than 72 hours.  · Your pain gets worse.  · You vomit repeatedly.  · You become weak or confused.  · You develop a severe headache.  · You become severely dehydrated. Signs of severe dehydration include:  ¨ A very dry mouth.  ¨ A rapid, weak pulse.  ¨ Rapid breathing.  ¨ Blue lips.     This information is not intended to replace advice given to you by your health care provider. Make sure you discuss any questions you have with your health care provider.     Document Released: 12/18/2006 Document Revised: 05/03/2016 Document Reviewed: 12/14/2015  [employer] Interactive Patient Education ©2016 [employer]    · Metabolic encephalopathy  • Hepatic encephalopathy  • Toxic encephalopathy  • Hypertensive encephalopathy  • Other type of encephalopathy  • Other explanation of clinical findings  • Unable to determine      Discharge Instructions    Discharged to home by car with relative. Discharged via wheelchair, hospital escort: Yes.  Special equipment needed: Not Applicable    Be sure to schedule a follow-up appointment with your primary care doctor or any specialists as instructed.     Discharge Plan:   Diet Plan: Discussed  Activity Level: Discussed  Confirmed Follow up Appointment: Patient to Call and Schedule Appointment  Confirmed Symptoms Management: Discussed  Medication Reconciliation Updated: Yes  Influenza Vaccine Indication: Patient Refuses    I understand that a diet low in cholesterol, fat, and  sodium is recommended for good health. Unless I have been given specific instructions below for another diet, I accept this instruction as my diet prescription.   Other diet: Diabetic diet    Special Instructions: None    · Is patient discharged on Warfarin / Coumadin?   No     · Is patient Post Blood Transfusion?  No    Depression / Suicide Risk    As you are discharged from this [facility] facility, it is important to learn how to keep safe from harming yourself.    Recognize the warning signs:  · Abrupt changes in personality, positive or negative- including increase in energy   · Giving away possessions  · Change in eating patterns- significant weight changes-  positive or negative  · Change in sleeping patterns- unable to sleep or sleeping all the time   · Unwillingness or inability to communicate  · Depression  · Unusual sadness, discouragement and loneliness  · Talk of wanting to die  · Neglect of personal appearance   · Rebelliousness- reckless behavior  · Withdrawal from people/activities they love  · Confusion- inability to concentrate     If you or a loved one observes any of these behaviors or has concerns about self-harm, here's what you can do:  · Talk about it- your feelings and reasons for harming yourself  · Remove any means that you might use to hurt yourself (examples: pills, rope, extension cords, firearm)  · Get professional help from the community (Mental Health, Substance Abuse, psychological counseling)  · Do not be alone:Call your Safe Contact- someone whom you trust who will be there for you.  · Call your local CRISIS HOTLINE 326-4030 or 794-256-5418  · Call your local Children's Mobile Crisis Response Team [place] (802) 727-6911 or www.[employer]  · Call the toll free National Suicide Prevention Hotlines   · National Suicide Prevention Lifeline 276-355-PVDE (0141)  · National Hope Line Network 800-SUICIDE (538-6927)          Your appointments     Jun 26, 2017  1:30 PM    ONCOLOGY EST PATIENT 30 MIN with SLIM WatkinsPDANITA   [facility] (--)    [address], [address]  [facility] [address]   [phone]            Jul 03, 2017  8:30 AM   Non Provider 1 with ONC RN 2   [facility] (--)    [address], [address]  [facility] [address]   [phone]           You will be receiving a confirmation call a few days before your appointment from our automated call confirmation system.            Jul 03, 2017  9:00 AM   ONCOLOGY EST PATIENT 30 MIN with SLIM WatkinsPJaciNJaci   [facility] (--)    [address], [address]  [facility] [address]   [phone]            Jul 03, 2017 11:00 AM   Est Chemo 3 with RN 2   [facility] ([facility])    [address] [facility] [address]  [place] [address]   [phone]            Jul 05, 2017  5:00 PM   EST Port Flush / Central Line Care with INFUSION QUICK INJECT   [facility] ([facility])    [address] [facility] [address]  [place] [address]   [phone]            Jul 17, 2017  8:30 AM   Non Provider 1 with ONC MA 1   [facility] (--)    [address] [facility] [address]  [facility] [address]   [phone]           You will be receiving a confirmation call a few days before your appointment from our automated call confirmation system.            Jul 17, 2017 10:00 AM   ONCOLOGY EST PATIENT 30 MIN with SLIM WatkinsP.NJaci   [facility] (--)    [address], [address]  [place] [address]   [phone]            Jul 17, 2017 11:00 AM   Est Chemo 3 with RN 2   [facility] ([facility])    [address] [facility] [address]  [place] [address]   [phone]            Jul 19, 2017 11:30 AM   EST Port Flush / Central Line Care with RN 5   [facility] ([facility])    [address] [facility] [address]  [place] [address]   [phone]            Jul 31, 2017  8:30 AM   Non Provider 1 with ONC RN 1   [facility] (--)    [address], [address]  [facility] [id number] [phone]           You  will be receiving a confirmation call a few days before your appointment from our automated call confirmation system.            Jul 31, 2017 10:00 AM   ONCOLOGY EST PATIENT 30 MIN with Marlon Moss M.D.   [facility] (--)    [address], [address]  [facility] [address]   [phone]            Jul 31, 2017 11:00 AM   Est Chemo 3 with RN 2   [facility] ([facility])    [address] [facility] [address]  [facility] [address] [phone]            Aug 02, 2017 11:30 AM   EST Port Flush / Central Line Care with RN 2   [facility] ([facility])    [address] [facility] [address]  [facility] [id number] [phone]              Follow-up Information     1. Follow up with ANGÉLICA Watkins.    Specialty:  Oncology    Why:  as scheduled    Contact information    [address]  [place] [address]  [phone]          2. Follow up with Laboratory near you.    Why:  for labs Wednesday and Friday         Discharge Medication Instructions:    Below are the medications your physician expects you to take upon discharge:    Review all your home medications and newly ordered medications with your doctor and/or pharmacist. Follow medication instructions as directed by your doctor and/or pharmacist.    Please keep your medication list with you and share with your physician.               Medication List      START taking these medications        Instructions    Morning Afternoon Evening Bedtime    nystatin 202008 UNIT/ML Susp   Last time this was given:  500,000 Units on 6/26/2017  7:40 AM   Commonly known as:  MYCOSTATIN   Next Dose Due:  Today after lunch        Take 5 mL by mouth 4 times a day for 14 days.   Dose:  5 mL                          CONTINUE taking these medications        Instructions    Morning Afternoon Evening Bedtime    furosemide 40 MG Tabs   Commonly known as:  LASIX   Next Dose Due:  Tomorrow morning        Take 40 mg by mouth 2 Times a Day.   Dose:  40 mg                         glimepiride 4 MG Tabs   Last time this was given:  4 mg on 6/26/2017  7:41 AM   Commonly known as:  AMARYL   Next Dose Due:  Tonight at bedtime        Take 4 mg by mouth 2 times a day.   Dose:  4 mg                        insulin glargine 100 UNIT/ML Soln   Last time this was given:  20 Units on 6/25/2017  9:19 PM   Commonly known as:  LANTUS   Next Dose Due:  This evening        Inject 20 Units as instructed every evening.   Dose:  20 Units                        JARDIANCE 25 MG Tabs   Generic drug:  Empagliflozin   Next Dose Due:  Tomorrow morning        Take 25 mg by mouth every day.   Dose:  25 mg                        lidocaine-prilocaine 2.5-2.5 % Crea   Commonly known as:  EMLA   Next Dose Due:  As needed        Doctor's comments:  Directions: Apply to port 1 hour prior to access of port and cover with plastic wrap.   Apply to port one hour prior to access and cover with plastic wrap.                        loperamide 2 MG tablet   Commonly known as:  IMODIUM A-D   Next Dose Due:  As needed        Doctor's comments:  Give loperamide 4 mg orally first dose, then 2 mg orally with every loose bowel movement.  Contact physician if maximum of 16 mg/24 hours is exceeded.   Take 1 Tab by mouth See Admin Instructions.   Dose:  2 mg                        metformin 500 MG Tabs   Commonly known as:  GLUCOPHAGE   Next Dose Due:  Tonight with dinner        Take 500 mg by mouth 2 times a day, with meals.   Dose:  500 mg                        morphine ER 30 MG Tbcr tablet   Last time this was given:  30 mg on 6/25/2017  8:11 AM   Commonly known as:  MS CONTIN   Next Dose Due:  Tonight at bedtime        Take 1 Tab by mouth every 12 hours.   Dose:  30 mg                        niacin 500 MG Tabs   Next Dose Due:  Tonight at bedtime        Take 500 mg by mouth 2 times a day.   Dose:  500 mg                        ondansetron 4 MG Tabs tablet   Commonly known as:  ZOFRAN   Next Dose Due:  As needed        Take 1 Tab by mouth  every four hours as needed for Nausea/Vomiting (for nausea, vomiting).   Dose:  4 mg                        oxycodone immediate release 10 MG immediate release tablet   Last time this was given:  10 mg on 6/24/2017  9:04 PM   Commonly known as:  ROXICODONE   Next Dose Due:  As needed        Take 1 Tab by mouth every four hours as needed for Moderate Pain.   Dose:  10 mg                        potassium chloride SA 20 MEQ Tbcr   Commonly known as:  Kdur   Next Dose Due:  Tomorrow morning        Take 20 mEq by mouth every day.   Dose:  20 mEq                        prochlorperazine 10 MG Tabs   Commonly known as:  COMPAZINE   Next Dose Due:  As needed        Take 1 Tab by mouth every 6 hours as needed (for nausea, vomiting).   Dose:  10 mg                        therapeutic multivitamin-minerals Tabs   Next Dose Due:  Tomorrow morning        Take 1 Tab by mouth every day.   Dose:  1 Tab                        tramadol 50 MG Tabs   Commonly known as:  ULTRAM   Next Dose Due:  As needed        Take  mg by mouth every 6 hours as needed.   Dose:   mg                        XARELTO 15 MG Tabs tablet   Last time this was given:  15 mg on 6/26/2017  7:40 AM   Generic drug:  rivaroxaban   Next Dose Due:  Tonight at 8pm        Take 15 mg by mouth 2 Times a Day.   Dose:  15 mg                             Where to Get Your Medications      These medications were sent to [facility] [address] [place], [place] [address] [address] [address]     Phone:  [phone]    - nystatin 611704 UNIT/ML Susp            Instructions           Diet / Nutrition:    Follow any diet instructions given to you by your doctor or the dietician, including how much salt (sodium) you are allowed each day.    If you are overweight, talk to your doctor about a weight reduction plan.    Activity:    Remain physically active following your doctor's instructions about exercise and activity.    Rest often.     Any time  you become even a little tired or short of breath, SIT DOWN and rest.    Worsening Symptoms:    Report any of the following signs and symptoms to the doctor's office immediately:    *Pain of jaw, arm, or neck  *Chest pain not relieved by medication                               *Dizziness or loss of consciousness  *Difficulty breathing even when at rest   *More tired than usual                                       *Bleeding drainage or swelling of surgical site  *Swelling of feet, ankles, legs or stomach                 *Fever (>100ºF)  *Pink or blood tinged sputum  *Weight gain (3lbs/day or 5lbs /week)           *Shock from internal defibrillator (if applicable)  *Palpitations or irregular heartbeats                *Cool and/or numb extremities    Stroke Awareness    Common Risk Factors for Stroke include:    Age  Atrial Fibrillation  Carotid Artery Stenosis  Diabetes Mellitus  Excessive alcohol consumption  High blood pressure  Overweight   Physical inactivity  Smoking    Warning signs and symptoms of a stroke include:    *Sudden numbness or weakness of the face, arm or leg (especially on one side of the body).  *Sudden confusion, trouble speaking or understanding.  *Sudden trouble seeing in one or both eyes.  *Sudden trouble walking, dizziness, loss of balance or coordination.Sudden severe headache with no known cause.    It is very important to get treatment quickly when a stroke occurs. If you experience any of the above warning signs, call 911 immediately.                   Disclaimer         Quit Smoking / Tobacco Use:    I understand the use of any tobacco products increases my chance of suffering from future heart disease or stroke and could cause other illnesses which may shorten my life. Quitting the use of tobacco products is the single most important thing I can do to improve my health. For further information on smoking / tobacco cessation call a Toll Free Quit Line at 1-359.985.7869 (*[employer]  [place]) or 1-242.708.1855 (American Lung Association) or you can access the web based program at www.lungusa.org.    [place] Tobacco Users Help Line:  (975) 904-5794       Toll Free: 1-902.195.1374  Quit Tobacco Program [facility] (113)240-7466    Crisis Hotline:    [place] Crisis Hotline:  9-333-CYCFSSX or 1-986.678.2116    [place] Crisis Hotline:    1-305.957.5942 or 870-962-9922    Discharge Survey:   Thank you for choosing [facility]. We hope we did everything we could to make your hospital stay a pleasant one. You may be receiving a phone survey and we would appreciate your time and participation in answering the questions. Your input is very valuable to us in our efforts to improve our service to our patients and their families.        My signature on this form indicates that:    1. I have reviewed and understand the above information.  2. My questions regarding this information have been answered to my satisfaction.  3. I have formulated a plan with my discharge nurse to obtain my prescribed medications for home.                  Disclaimer         __________________________________                     __________       ________                       Patient Signature                                                 Date                    Time

## 2017-06-24 PROBLEM — R74.01 TRANSAMINITIS: Status: ACTIVE | Noted: 2017-06-24

## 2017-06-24 PROBLEM — R74.8 ELEVATED ALKALINE PHOSPHATASE LEVEL: Status: ACTIVE | Noted: 2017-06-24

## 2017-06-24 PROBLEM — D72.829 LEUKOCYTOSIS: Status: ACTIVE | Noted: 2017-06-24

## 2017-06-24 PROBLEM — G93.40 ENCEPHALOPATHY: Status: ACTIVE | Noted: 2017-06-24

## 2017-06-24 PROBLEM — K83.1 OBSTRUCTIVE JAUNDICE: Status: ACTIVE | Noted: 2017-06-24

## 2017-06-24 PROBLEM — D69.6 THROMBOCYTOPENIA (HCC): Status: ACTIVE | Noted: 2017-06-24

## 2017-06-24 PROBLEM — R19.7 DIARRHEA: Status: ACTIVE | Noted: 2017-06-24

## 2017-06-24 PROBLEM — E11.9 DIABETES (HCC): Status: ACTIVE | Noted: 2017-06-24

## 2017-06-24 PROBLEM — I26.99 PULMONARY EMBOLI (HCC): Status: ACTIVE | Noted: 2017-06-24

## 2017-06-24 LAB
ALBUMIN SERPL BCP-MCNC: 3.2 G/DL (ref 3.2–4.9)
ALBUMIN/GLOB SERPL: 1 G/DL
ALP SERPL-CCNC: 871 U/L (ref 30–99)
ALT SERPL-CCNC: 191 U/L (ref 2–50)
ANION GAP SERPL CALC-SCNC: 15 MMOL/L (ref 0–11.9)
AST SERPL-CCNC: 97 U/L (ref 12–45)
BASOPHILS # BLD AUTO: 0.3 % (ref 0–1.8)
BASOPHILS # BLD: 0.05 K/UL (ref 0–0.12)
BILIRUB SERPL-MCNC: 4.8 MG/DL (ref 0.1–1.5)
BUN SERPL-MCNC: 19 MG/DL (ref 8–22)
CALCIUM SERPL-MCNC: 9 MG/DL (ref 8.5–10.5)
CHLORIDE SERPL-SCNC: 107 MMOL/L (ref 96–112)
CO2 SERPL-SCNC: 19 MMOL/L (ref 20–33)
CREAT SERPL-MCNC: 0.58 MG/DL (ref 0.5–1.4)
EOSINOPHIL # BLD AUTO: 0.11 K/UL (ref 0–0.51)
EOSINOPHIL NFR BLD: 0.7 % (ref 0–6.9)
ERYTHROCYTE [DISTWIDTH] IN BLOOD BY AUTOMATED COUNT: 47.3 FL (ref 35.9–50)
GFR SERPL CREATININE-BSD FRML MDRD: >60 ML/MIN/1.73 M 2
GLOBULIN SER CALC-MCNC: 3.3 G/DL (ref 1.9–3.5)
GLUCOSE BLD-MCNC: 187 MG/DL (ref 65–99)
GLUCOSE BLD-MCNC: 214 MG/DL (ref 65–99)
GLUCOSE BLD-MCNC: 220 MG/DL (ref 65–99)
GLUCOSE BLD-MCNC: 249 MG/DL (ref 65–99)
GLUCOSE SERPL-MCNC: 208 MG/DL (ref 65–99)
HCT VFR BLD AUTO: 41.7 % (ref 42–52)
HGB BLD-MCNC: 13.4 G/DL (ref 14–18)
IMM GRANULOCYTES # BLD AUTO: 0.09 K/UL (ref 0–0.11)
IMM GRANULOCYTES NFR BLD AUTO: 0.6 % (ref 0–0.9)
LYMPHOCYTES # BLD AUTO: 0.69 K/UL (ref 1–4.8)
LYMPHOCYTES NFR BLD: 4.6 % (ref 22–41)
MAGNESIUM SERPL-MCNC: 2.3 MG/DL (ref 1.5–2.5)
MCH RBC QN AUTO: 28.6 PG (ref 27–33)
MCHC RBC AUTO-ENTMCNC: 32.1 G/DL (ref 33.7–35.3)
MCV RBC AUTO: 88.9 FL (ref 81.4–97.8)
MONOCYTES # BLD AUTO: 0.29 K/UL (ref 0–0.85)
MONOCYTES NFR BLD AUTO: 1.9 % (ref 0–13.4)
NEUTROPHILS # BLD AUTO: 13.78 K/UL (ref 1.82–7.42)
NEUTROPHILS NFR BLD: 91.9 % (ref 44–72)
NRBC # BLD AUTO: 0 K/UL
NRBC BLD AUTO-RTO: 0 /100 WBC
PLATELET # BLD AUTO: 105 K/UL (ref 164–446)
PMV BLD AUTO: 10.3 FL (ref 9–12.9)
POTASSIUM SERPL-SCNC: 3.8 MMOL/L (ref 3.6–5.5)
PROT SERPL-MCNC: 6.5 G/DL (ref 6–8.2)
RBC # BLD AUTO: 4.69 M/UL (ref 4.7–6.1)
SODIUM SERPL-SCNC: 141 MMOL/L (ref 135–145)
WBC # BLD AUTO: 15 K/UL (ref 4.8–10.8)

## 2017-06-24 PROCEDURE — 85025 COMPLETE CBC W/AUTO DIFF WBC: CPT

## 2017-06-24 PROCEDURE — A9270 NON-COVERED ITEM OR SERVICE: HCPCS | Performed by: INTERNAL MEDICINE

## 2017-06-24 PROCEDURE — 700102 HCHG RX REV CODE 250 W/ 637 OVERRIDE(OP): Performed by: INTERNAL MEDICINE

## 2017-06-24 PROCEDURE — 770009 HCHG ROOM/CARE - ONCOLOGY SEMI PRI*

## 2017-06-24 PROCEDURE — 80053 COMPREHEN METABOLIC PANEL: CPT

## 2017-06-24 PROCEDURE — 700111 HCHG RX REV CODE 636 W/ 250 OVERRIDE (IP): Performed by: INTERNAL MEDICINE

## 2017-06-24 PROCEDURE — 99233 SBSQ HOSP IP/OBS HIGH 50: CPT | Performed by: INTERNAL MEDICINE

## 2017-06-24 PROCEDURE — 700105 HCHG RX REV CODE 258: Performed by: INTERNAL MEDICINE

## 2017-06-24 PROCEDURE — 82962 GLUCOSE BLOOD TEST: CPT | Mod: 91

## 2017-06-24 PROCEDURE — 83735 ASSAY OF MAGNESIUM: CPT

## 2017-06-24 RX ORDER — LOPERAMIDE HYDROCHLORIDE 2 MG/1
2 CAPSULE ORAL 4 TIMES DAILY PRN
Status: DISCONTINUED | OUTPATIENT
Start: 2017-06-24 | End: 2017-06-26 | Stop reason: HOSPADM

## 2017-06-24 RX ORDER — MORPHINE SULFATE 30 MG/1
30 TABLET, FILM COATED, EXTENDED RELEASE ORAL EVERY 12 HOURS
Status: DISCONTINUED | OUTPATIENT
Start: 2017-06-24 | End: 2017-06-26 | Stop reason: HOSPADM

## 2017-06-24 RX ORDER — GLIMEPIRIDE 4 MG/1
4 TABLET ORAL 2 TIMES DAILY
Status: DISCONTINUED | OUTPATIENT
Start: 2017-06-24 | End: 2017-06-26 | Stop reason: HOSPADM

## 2017-06-24 RX ORDER — INSULIN GLARGINE 100 [IU]/ML
20 INJECTION, SOLUTION SUBCUTANEOUS NIGHTLY
Status: DISCONTINUED | OUTPATIENT
Start: 2017-06-24 | End: 2017-06-26 | Stop reason: HOSPADM

## 2017-06-24 RX ADMIN — LACTULOSE 30 ML: 20 SOLUTION ORAL at 21:04

## 2017-06-24 RX ADMIN — INSULIN LISPRO 2 UNITS: 100 INJECTION, SOLUTION INTRAVENOUS; SUBCUTANEOUS at 20:55

## 2017-06-24 RX ADMIN — INSULIN LISPRO 2 UNITS: 100 INJECTION, SOLUTION INTRAVENOUS; SUBCUTANEOUS at 18:27

## 2017-06-24 RX ADMIN — MORPHINE SULFATE 30 MG: 30 TABLET, EXTENDED RELEASE ORAL at 21:04

## 2017-06-24 RX ADMIN — INSULIN LISPRO 2 UNITS: 100 INJECTION, SOLUTION INTRAVENOUS; SUBCUTANEOUS at 13:07

## 2017-06-24 RX ADMIN — Medication 220 MG: at 08:56

## 2017-06-24 RX ADMIN — INSULIN GLARGINE 20 UNITS: 100 INJECTION, SOLUTION SUBCUTANEOUS at 20:55

## 2017-06-24 RX ADMIN — RIVAROXABAN 15 MG: 15 TABLET, FILM COATED ORAL at 21:04

## 2017-06-24 RX ADMIN — OXYCODONE HYDROCHLORIDE 10 MG: 10 TABLET ORAL at 21:04

## 2017-06-24 RX ADMIN — LACTULOSE 30 ML: 20 SOLUTION ORAL at 16:06

## 2017-06-24 RX ADMIN — GLIMEPIRIDE 4 MG: 4 TABLET ORAL at 18:26

## 2017-06-24 RX ADMIN — CEFTRIAXONE SODIUM 2 G: 2 INJECTION, POWDER, FOR SOLUTION INTRAMUSCULAR; INTRAVENOUS at 08:55

## 2017-06-24 RX ADMIN — INSULIN LISPRO 1 UNITS: 100 INJECTION, SOLUTION INTRAVENOUS; SUBCUTANEOUS at 09:01

## 2017-06-24 RX ADMIN — LACTULOSE 30 ML: 20 SOLUTION ORAL at 08:55

## 2017-06-24 ASSESSMENT — ENCOUNTER SYMPTOMS
DIARRHEA: 1
WEAKNESS: 1
COUGH: 0
FEVER: 0
DIZZINESS: 0
SPEECH CHANGE: 0
PHOTOPHOBIA: 0
HALLUCINATIONS: 0
BLURRED VISION: 0
ABDOMINAL PAIN: 1
SHORTNESS OF BREATH: 0
BLOOD IN STOOL: 0
CHILLS: 0
NAUSEA: 0
EYE PAIN: 0
MYALGIAS: 0
HEADACHES: 0
NERVOUS/ANXIOUS: 0
CLAUDICATION: 0
DEPRESSION: 0
SENSORY CHANGE: 0
HEARTBURN: 0

## 2017-06-24 ASSESSMENT — PAIN SCALES - GENERAL
PAINLEVEL_OUTOF10: 5
PAINLEVEL_OUTOF10: 0

## 2017-06-24 NOTE — PROGRESS NOTES
[facility] Hospitalist Progress Note    Date of Service: 2017    Chief Complaint  52 y.o. male admitted 2017 with newly diagnosed pancreatic adenocarcinoa, s/p first round chemo at [facility], presented with jaundice and confusion.    Interval Problem Update  Patient's mentation has improved since admission, still very tired and not back to himself.  GI consulted and ERCP with stenting would not be of benefit at this time.  I will resume his diabetic medications, advance diet to diabetic and resume xarelto given his sub-acute dvt/pe's.  Sat down with patient as his diagnosis is new and he and wife still have many questions that they didn't feel had been answered since they had the news.  Lavern Abbasi is their RN navigator.    Consultants/Specialty  GI - Madhav/Karena    Disposition  Home soon        Review of Systems   Constitutional: Positive for malaise/fatigue. Negative for fever and chills.   HENT: Negative for nosebleeds.    Eyes: Negative for blurred vision, photophobia and pain.   Respiratory: Negative for cough and shortness of breath.    Cardiovascular: Negative for chest pain, claudication and leg swelling.   Gastrointestinal: Positive for abdominal pain and diarrhea. Negative for heartburn, nausea, blood in stool and melena.   Genitourinary: Negative for dysuria and hematuria.   Musculoskeletal: Negative for myalgias and joint pain.   Neurological: Positive for weakness. Negative for dizziness, sensory change, speech change and headaches.   Psychiatric/Behavioral: Negative for depression and hallucinations. The patient is not nervous/anxious.       Physical Exam  Laboratory/Imaging   Hemodynamics  Temp (24hrs), Av.6 °C (97.9 °F), Min:36.6 °C (97.8 °F), Max:36.8 °C (98.2 °F)   Temperature: 36.8 °C (98.2 °F)  Pulse  Av.9  Min: 77  Max: 136 Heart Rate (Monitored): 82  Blood Pressure: 129/71 mmHg      Respiratory      Respiration: 16, Pulse Oximetry: 99 %        RUL Breath Sounds: Diminished, RML  Breath Sounds: Diminished, RLL Breath Sounds: Diminished, KITTY Breath Sounds: Diminished, LLL Breath Sounds: Diminished    Fluids  No intake or output data in the 24 hours ending 06/24/17 1501    Nutrition  Orders Placed This Encounter   Procedures   • DIET ORDER     Standing Status: Standing      Number of Occurrences: 1      Standing Expiration Date:      Order Specific Question:  Diet:     Answer:  Diabetic [3]     Order Specific Question:  Diet:     Answer:  Regular [1]     Physical Exam   Constitutional: He is oriented to person, place, and time. He appears well-developed and well-nourished.   HENT:   Head: Normocephalic and atraumatic.   Eyes: Conjunctivae are normal. Scleral icterus (very subtle) is present.   Neck: Neck supple. No JVD present.   Cardiovascular: Normal rate, regular rhythm and normal heart sounds.  Exam reveals no gallop and no friction rub.    No murmur heard.  Pulmonary/Chest: Effort normal and breath sounds normal. No respiratory distress. He exhibits no tenderness.   Abdominal: Soft. Bowel sounds are normal. He exhibits no distension and no mass. There is tenderness (ruq tenderness).   Musculoskeletal: He exhibits no edema or tenderness.   Neurological: He is alert and oriented to person, place, and time. No cranial nerve deficit.   Skin: Skin is warm and dry. No rash noted. He is not diaphoretic.   Psychiatric: He has a normal mood and affect. His behavior is normal.   Nursing note and vitals reviewed.      Recent Labs      06/23/17   0904  06/24/17   0210   WBC  13.3*  15.0*   RBC  4.69*  4.69*   HEMOGLOBIN  13.8*  13.4*   HEMATOCRIT  42.4  41.7*   MCV  90.4  88.9   MCH  29.4  28.6   MCHC  32.5*  32.1*   RDW  48.1  47.3   PLATELETCT  105*  105*   MPV  11.0  10.3     Recent Labs      06/23/17   0904  06/24/17   0210   SODIUM  141  141   POTASSIUM  4.2  3.8   CHLORIDE  102  107   CO2  17*  19*   GLUCOSE  350*  208*   BUN  29*  19   CREATININE  0.73  0.58   CALCIUM  9.3  9.0     Recent Labs       06/23/17   0904   INR  1.37*                  Assessment/Plan     * Encephalopathy  Assessment & Plan  Likely multifactorial  Improved mentation      Pancreatic cancer metastasized to liver (CMS-HCC) (present on admission)  Assessment & Plan  Oncology - Dr Moss  First chemo last week, had diarrhea afterward  Will d/w dr Lopez       Obstructive jaundice  Assessment & Plan  Stenting would be of little benefit as no dilated area that could be relieved  T bili trending down with hydration alone  Mentation improved      Diabetes (CMS-HCC)  Assessment & Plan  Diabetic diet  Resume glimepride, lantus, Jardiance  SSI as needed      Leukocytosis  Assessment & Plan  No evidence infection, empiric rocephin started  Likely d/t chemo      Diarrhea  Assessment & Plan  PRN imodium  D/t chemo      Thrombocytopenia (CMS-HCC)  Assessment & Plan  Chemo plus hepatic involvement with cancer      Transaminitis  Assessment & Plan  D/t hepatic invasion of cancer      Elevated alkaline phosphatase level  Assessment & Plan  Hepatic involvement of cancer      Pulmonary emboli (CMS-HCC)  Assessment & Plan  Resume xarelto as no surgical/procedural intervention planned at this time      Labs reviewed, Medications reviewed and Radiology images reviewed  Bennett catheter: No Bennett      DVT: xarelto.      Antibiotics: Treating active infection/contamination beyond 24 hours perioperative coverage

## 2017-06-24 NOTE — ASSESSMENT & PLAN NOTE
Stenting would be of little benefit as no dilated area that could be relieved  T bili trending down with hydration alone  Mentation improved

## 2017-06-24 NOTE — CARE PLAN
Problem: Safety  Goal: Will remain free from injury  Outcome: PROGRESSING AS EXPECTED  Patient educated about the importance of calling for assistance. Patient verbalizes understanding and calls for assistance appropriately. Safety precautions in place including patient call light within reach, bed in low position and locked, personal possessions close by, patient rounding [id number], bed alarm on, frequent toileting offered, and non-skid socks in place. Patient remains free of injury since start of shift.           Problem: Knowledge Deficit  Goal: Knowledge of the prescribed therapeutic regimen will improve  Outcome: PROGRESSING AS EXPECTED  Patient informed on plan of care and educated on medications ordered per MAR. Patient verbalizes understanding and states all of his questions/concerns have been answered appropriately.

## 2017-06-24 NOTE — H&P
CHIEF COMPLAINT:  Coming in because of worsening belly pain and yellowness of   skin.    HISTORY OF PRESENT ILLNESS:  The patient is a 52-year-old male with a history   of metastatic pancreatic adenocarcinoma with mets to the liver with known PEs,   on Xarelto.  The patient is followed by Dr. Moss of [facility].    Patient underwent his first round of chemo, which finished on Wednesday.  He   tolerated it somewhat well.  He lives in [place] and apparently he was   feeling dehydrated and very weak yesterday, went to the [facility] where he received IV fluid hydration and went home.  He continued a   few weak and then apparently was having even yellowing of the skin and his   labs have worsened.  Dr. Moss told the patient to come in today.  In the ED,   the patient was found to have a worsening bilirubin, possible obstruction.    Dr. Corey, GI was consulted.  Patient states he has really no obvious belly   pain, but has been increasingly confused at home, very weak, very tired, and   somewhat hallucinating.  He has never had this before.  He says the stool is a   little bit loose in nature since the chemotherapy he received recently on   Wednesday, but denies any blood in the stool.  He says this was actually   initially quite light and his urine has turned somewhat darker.  He stopped   taking the morphine last night at the request of Dr. Moss because of his   worsening mental status.  Denies any obvious nausea, vomiting, but is spitting   up a lot and has a very poor appetite.    REVIEW OF SYSTEMS:  All other systems reviewed are negative.    In the ED, NS bolus of 3.8 L was given.  GI was consulted, IV ceftriaxone   given.    PAST MEDICAL HISTORY:  1.  Metastatic pancreatic adenocarcinoma with mets to the liver.  2.  Deep vein thrombosis and PE secondary to cancer, on Xarelto.  3.  Acquired hemorrhagic disorder with extrinsic circulating anticoagulants.    PAST SURGICAL HISTORY:   Gastric sleeve in 2013.    FAMILY HISTORY:  Heart disease.    SOCIAL HISTORY:  No alcohol, tobacco, or drugs.  Lives in [place].    ALLERGIES:  None.    MEDICATIONS PRIOR TO ADMISSION:  1.  Lasix 40 mg tablets b.i.d.  2.  Glimepiride 4 mg tablets b.i.d.  3.  Lantus 20 units every evening.  4.  Jardiance 25 mg tablets daily.  5.  EMLA cream.  6.  Imodium one tablet daily as needed.  7.  Metformin 500 mg b.i.d. with meals.  8.  MS Contin 30 mg tablets every 12 hours.  9.  Niacin 500 mg tablets b.i.d.  10.  Zofran 4 mg tablets every 4 hours as needed for nausea and vomiting.  11.  Oxycodone immediate release 10 mg tablets every 4 hours as needed for   moderate pain.  12.  Potassium chloride 20 mEq everyday.  13.  Compazine 10 mg tablets every 6 hours as needed for nausea and vomiting.  14.  Multivitamin tablet daily.  15.  Tramadol  mg tablets every 6 hours as needed for pain.  16.  Xarelto 50 mg tablets b.i.d., last dose was last night.    PHYSICAL EXAMINATION:  VITAL SIGNS:  Temperature is 36.6, heart rate 77, respiratory rate 18, oxygen   saturation 99% on 4 L nasal cannula, and blood pressure 116/57.  GENERAL:  Patient is in no obvious distress.  He is A and O x4, speaking in   full sentences.  HEENT:  Somewhat dry mucus membranes.  Poor dentition, flat JVP.  Sensation   was intact.  Scleral icterus, jaundice.  CARDIOVASCULAR:  Regular rate and rhythm.  No murmurs, gallops, or rubs.  LUNGS:  Clear to auscultation bilaterally except for rales at the bases.  No   use of accessory muscles.  ABDOMEN:  Obese and soft.  No obvious tenderness to palpation.  Normoactive   bowel sounds.  Hepatosplenomegaly.  Negative Maxwell's sign.  EXTREMITIES:  He has 3+ pitting edema bilateral lower extremities, somewhat   worse on the right than the left, he is warm peripherally.  NEUROLOGICAL:  Intact sensation.  Nonfocal.  No asterixis appreciated.  MUSCULOSKELETAL:  Full range of motion in all extremities, 5/5 muscle  strength   throughout.  SKIN:  No obvious rashes, lesions or excoriations except for mild jaundice.  PSYCHOLOGICAL:  He is A and O x4, pleasant and cooperative.    LABORATORY DATA AND IMAGING:  CBC remarkable for white blood cell count 13.3,   H and H 13.8 and 42.4, MCV is 94, platelet count is 105,000.  CMP is   remarkable for a sodium 141, potassium 4.2, chloride is 102, bicarbonate 17,   anion gap 22.0, glucose 350, BUN and creatinine 29 and 0.73, and calcium 9.3.     and , alkaline phosphatase 907, total bilirubin 6.1, ammonia   57, lactic acid 2.9, then 2.0, PT/INR is 72.3 and 1.37.  UA -- no evidence of   infection, large bilirubin, large ketones, and large glucose.  CA 19-9 is   20,000.    Pathology of liver biopsy on 2017 shows adenocarcinoma of the liver   favoring mucin production and is poorly differentiated.    IMAGIN.  Ultrasound of the gallbladder.  2.  Distended gallbladder without stones or sludge, nonspecific gallbladder   wall thickening, hepatomegaly with diffuse heterogeneity, and multiple liver   lesions.  Mildly prominent common bile duct measuring 0.7 cm.  3.  CT abdomen and pelvis with contrast:  Worsening appearance to multiple   liver metastases.  Unchanged nonocclusive thrombus in the portal confluence.    New ill-defined low attenuation area in the posterior spleen and bilateral   kidneys.  There is a nonspecific and could relate to metastases or areas of   infarct.  Nonspecific 3 cm low attenuation right adrenal nodules unchanged.    Grossly, unchanged on appearance to low attenuation pancreatic mass.    Distended gallbladder with apparent wall thickening, nonspecific, and can be   seen with liver disease.  Cholecystitis cannot be excluded.  Correlate with   ultrasound.  Partially visualized lower lung pulmonary emboli is seen on prior   examination.  Patchy peripheral bibasilar opacities could relate infarct.  4.  Portable chest x-ray.  5.  No acute  cardiopulmonary abnormalities were identified.    ASSESSMENT:  1.  Hyperbilirubinemia.  2.  Elevated AST and ALT.  3.  Worsening metastatic pancreatic adenocarcinoma, multiple metastases to the   liver.  4.  Elevated white blood cell count, concerning for possible intra-abdominal   infection.  No evidence of active sepsis at this time.  5.  Elevated lactic acidosis likely secondary to dehydration and mild   infection, but increased due to liver failure.  6.  Anion gap metabolic acidosis, likely secondary to possible starvation   ketosis and dehydration.  7.  History of pulmonary emboli and deep vein thrombosis secondary to   metastatic pancreatic cancer.  8.  Acquired hemorrhagic disorder with extrinsic circulating anticoagulants.  9.  Mild hepatic encephalopathy.    PLAN:  Patient will be admitted to the oncology unit.  Dr. Lopez will see   the patient tomorrow morning.  Dr. Corey of GI has been consulted.  The CT   scan confirms that the patient has worsening metastatic pancreatic cancer that   is probably affecting his liver.  It is odd that he has not shown any obvious   biliary ductal dilatation on ultrasound of gallbladder as well as CT scan.    He might need an MRCP.  Potentially, he might need a stent placed to help   reduce the bilirubin as if this continues to rise, it will preclude him from   further chemotherapy in the future.  I do not think he has active sepsis at   this time.  We will do IV ceftriaxone for intraabdominal source at this time.    We will trend LFTs.  Also do low dose lactulose to help with his mild hepatic   encephalopathy.  We will hold his Xarelto at this time.  We will not do   bilateral compression devices given his known DVTs.  Possible intervention   tomorrow morning.  We will do NS fluids at 83 mL an hour as to make sure we do   exacerbate his possible mild volume overload status.  We will also institute   zinc sulfate as well.  We will hold all his outpatient diabetes  medications   given his change in dietary status and watch his blood sugars closely.    CODE STATUS:  Full code and full care.    DIET:  N.p.o. for possible intervention.    DEEP VENOUS THROMBOSIS PROPHYLAXIS:  No pharmacological secondary to possible   GI intervention no bilateral SCDs secondary to active DVTs in bilateral lower   extremities.    I expect the patient stay would be over two midnights due to all of his   current medical issues.       ____________________________________     MD ISABEL SOTO / AMENA    DD:  06/23/2017 16:44:12  DT:  06/23/2017 20:48:06    D#:  [id number]  Job#:  [id number]

## 2017-06-24 NOTE — CONSULTS
DATE OF SERVICE:  06/23/2017    REFERRING PHYSICIAN:  Geovanny Stoddard MD from ER    REASON FOR CONSULTATION:  A 52-year-old male with metastatic pancreatic   cancer, malaise, fatigue, and jaundice.    HISTORY OF PRESENT ILLNESS:  The patient is an unfortunate 52-year-old man   from [place], recently diagnosed with metastatic pancreatic adenocarcinoma.    He had his first course of chemo starting 5 days ago on Monday of this week   through Wednesday.  Over the last 2 days, he has developed progressive   malaise, fatigue, and jaundice.  He has noted light stools and dark urine.  He   denies any pruritus.  He denies any abdominal pain currently.  He has a   recent history of DVT and PE and his last dose of Xarelto was 2 days ago.    Here, he denies any fevers or chills and he has received a dose of IV   ceftriaxone.  CT scan done today shows pancreatic head mass with extensive   liver metastases and a distended gallbladder, but the intrahepatic ducts are   not dilated.  There is also portal vein thrombosis and some new low   attenuation lesions in the spleen and kidneys.    PAST MEDICAL HISTORY:  Recently diagnosed pancreatic cancer with liver mets,   recent DVT and pulmonary emboli, anticoagulation, diabetes, hyperlipidemia,   hypertension, portal vein thrombosis and obesity.    PAST SURGICAL HISTORY:  He had a gastric sleeve resection in 2013.  His peak   weight was about 450 pounds.    ALLERGIES:  None.    CURRENT MEDICATIONS:  Furosemide, glimepiride, insulin, Jardiance, loperamide,   metformin, morphine, niacin, Zofran, oxycodone, tramadol, and Xarelto.    SOCIAL HISTORY:  He is living with his girlfriend in [place].  He works at   [employer].    HABITS:  He is an ex-smoker, minimal alcohol currently, but drank heavily in   the past.    FAMILY HISTORY:  No cancer.    REVIEW OF SYSTEMS:  CONSTITUTIONAL:  Malaise but no fevers or chills.  NEUROLOGIC:  No strokes or seizures.  PSYCHIATRIC:  No  problems.  PULMONARY:  Denies asthma, wheezing or cough.  CARDIAC:  No chest pain.  GASTROINTESTINAL:  Noted above.  GENITOURINARY:  No problems.  MUSCULOSKELETAL:  No problems.  ENDOCRINE:  No history of thyroid problems.    PHYSICAL EXAMINATION:  GENERAL:  A large male in no distress.  VITAL SIGNS:  Temperature is 36.7, pulse 88, respirations are 16, blood   pressure 138/76.  SKIN:  Notable for jaundice.  LYMPH NODES:  No nodes.  HEAD AND NECK:  Sclerae are icteric.  Oropharynx clear.  NECK:  Supple.  LUNGS:  Clear bilaterally.  HEART:  Regular rate and rhythm without murmur.  ABDOMEN:  Obese, bowel sounds are present.  The abdomen is soft and nontender   and there is no mass.  EXTREMITIES:  Showed 1+ pitting edema.    LABORATORY DATA:  White count is 13.3, hemoglobin 13.8, hematocrit 42.4,   platelets 105,000.  Sodium 141, potassium 4.2, BUN is 29, creatinine 0.73,   glucose 350, CO2 of 17, , , alkaline phosphatase 907, total   bilirubin 6.1.  CA 19-9 is greater than 19,570.  INR 1.37.    IMPRESSION:  1.  Malaise and fatigue.  2.  Jaundice, currently his ducts are not really obstructed on CT, so the   liver enzyme elevation and bilirubin elevation may be due to hepatic burden of   liver mets.  3.  Metastatic pancreatic cancer.  4.  Weight loss.  5.  Distended gallbladder, question cholecystitis, but his exam is benign.  6.  History of deep venous thrombosis and pulmonary embolism now with new   renal and splenic lesions that may be embolic.  7.  Diabetes.  8.  Obesity, status post gastric sleeve resection in 2013.  9.  Hyperlipidemia.  10.  Hypertension.  11.  Portal vein thrombosis.  12.  Lactic acidosis.    PLAN:  1.  I agree with antibiotic coverage.  2.  Fluid resuscitation.  3.  Clear liquid diet is okay.  4.  Ultrasound of gallbladder.  5.  At this point, he does not have intrahepatic ductal dilation on CT per my   review with the radiologist.  it is unclear if the jaundice is due to    obstruction or a large tumor burden in the liver, but I suspect it is the   latter.  He will be assessed daily for indications for ERCP and stenting, but   at least at this point it does not look like stenting is indicated.       ____________________________________     MD EDDI BENZ / NTS    DD:  06/23/2017 15:03:30  DT:  06/23/2017 19:02:21    D#:  [id number]  Job#:  [id number]    cc: Marlon Moss MD

## 2017-06-24 NOTE — ASSESSMENT & PLAN NOTE
No evidence infection, empiric rocephin started  WBC normal - stop rocephin  Cultures negative.  Likely d/t chemo

## 2017-06-24 NOTE — PROGRESS NOTES
Gastroenterology Progress Note     Author: Alexander Thurston   Date & Time Created: 6/24/2017 11:51 AM    Interval History:  Problem: Jaundice, metastatic pancreatic cancer    This is a 52 year old with stage 4 pancreatic cancer with multiple liver metastasis who we were asked to see for jaundice.  - CT scan: numerous liver mets, nonocclusive thrombus in portal confluence, new spleen lesion (infarct vs met?), pancreatic mass, distended gallbladder w/o stones.  - US distended gallbladder without stones or sludge, nonspecific gallbladder wall thickening, hepatomegaly with numerous mets, 0.7 cm CBD (only mildly distended).    Course:  6/24/2017 He is tired, but denies abdominal pain, nausea and vomiting.    Review of Systems:  ROS    Physical Exam:  Physical Exam   Constitutional: He is oriented to person, place, and time.   Eyes: Scleral icterus is present.   Abdominal: Soft. Bowel sounds are normal. He exhibits no distension and no mass. There is no tenderness.   Neurological: He is alert and oriented to person, place, and time.       Labs:        Invalid input(s): [id number]      Recent Labs      06/23/17   0904  06/24/17   0210   SODIUM  141  141   POTASSIUM  4.2  3.8   CHLORIDE  102  107   CO2  17*  19*   BUN  29*  19   CREATININE  0.73  0.58   MAGNESIUM   --   2.3   CALCIUM  9.3  9.0     Recent Labs      06/23/17   0904  06/24/17   0210   ALTSGPT  250*  191*   ASTSGOT  168*  97*   ALKPHOSPHAT  907*  871*   TBILIRUBIN  6.1*  4.8*   GLUCOSE  350*  208*     Recent Labs      06/23/17   0904  06/24/17   0210   RBC  4.69*  4.69*   HEMOGLOBIN  13.8*  13.4*   HEMATOCRIT  42.4  41.7*   PLATELETCT  105*  105*   PROTHROMBTM  17.3*   --    INR  1.37*   --      Recent Labs      06/23/17   0904  06/24/17   0210   WBC  13.3*  15.0*   NEUTSPOLYS  95.60*  91.90*   LYMPHOCYTES  1.70*  4.60*   MONOCYTES  1.70  1.90   EOSINOPHILS  0.20  0.70   BASOPHILS  0.20  0.30   ASTSGOT  168*  97*   ALTSGPT  250*  191*   ALKPHOSPHAT  907*   871*   TBILIRUBIN  6.1*  4.8*     Hemodynamics:  Temp (24hrs), Av.6 °C (97.9 °F), Min:36.6 °C (97.8 °F), Max:36.8 °C (98.2 °F)  Temperature: 36.8 °C (98.2 °F)  Pulse  Av.9  Min: 77  Max: 136Heart Rate (Monitored): 82  Blood Pressure: 129/71 mmHg, NIBP: 127/74 mmHg     Respiratory:    Respiration: 16, Pulse Oximetry: 99 %        RUL Breath Sounds: Diminished, RML Breath Sounds: Diminished, RLL Breath Sounds: Diminished, KITTY Breath Sounds: Diminished, LLL Breath Sounds: Diminished  Fluids:  No intake or output data in the 24 hours ending 17 1151    GI/Nutrition:  Orders Placed This Encounter   Procedures   • DIET ORDER     Standing Status: Standing      Number of Occurrences: 1      Standing Expiration Date:      Order Specific Question:  Diet:     Answer:  Clear Liquid [10]     Medical Decision Making, by Problem:  Active Hospital Problems    Diagnosis   • Pancreatic cancer (CMS-HCC) [C25.9]     Impression:  1. Jaundice due to liver metastasis and compression of small ducts +/- cholestasis related to infection.  T bili is trending down.  2. Metastatic pancreatic cancer.  3. Leukocytosis.  He was put on antibiotics empirically for possible infection.  4. History of DVT/PE.  5. Gallbladder wall edema due to portal hypertension from liver disease.  6. New splenic lesion, infarct versus met.  7. DM.    Plan:  At this time I don't believe ERCP/stent would help as the biliary obstruction is in the small ducts, related to metastatic disease.  A stent wouldn't help unfortunately.    Core Measures

## 2017-06-24 NOTE — PROGRESS NOTES
Assumed care of pt at 1900. Bedside report received. AAOx4, VSS. Up with SBA to bathroom, steady gait. Denies pain at this time, reports loose stool. PIV patent, infusing IV fluids. Bed and chair alarm on and sounding. Pt prefers to sleep in recliner. POC discussed, call light in reach, pt calls for assistance, all needs met at this time.

## 2017-06-25 PROBLEM — B37.0 THRUSH: Status: ACTIVE | Noted: 2017-06-25

## 2017-06-25 LAB
ALBUMIN SERPL BCP-MCNC: 3 G/DL (ref 3.2–4.9)
ALBUMIN/GLOB SERPL: 1 G/DL
ALP SERPL-CCNC: 798 U/L (ref 30–99)
ALT SERPL-CCNC: 120 U/L (ref 2–50)
ANION GAP SERPL CALC-SCNC: 9 MMOL/L (ref 0–11.9)
AST SERPL-CCNC: 57 U/L (ref 12–45)
BACTERIA UR CULT: NORMAL
BASOPHILS # BLD AUTO: 0.7 % (ref 0–1.8)
BASOPHILS # BLD: 0.05 K/UL (ref 0–0.12)
BILIRUB SERPL-MCNC: 4.6 MG/DL (ref 0.1–1.5)
BUN SERPL-MCNC: 15 MG/DL (ref 8–22)
CALCIUM SERPL-MCNC: 8.6 MG/DL (ref 8.5–10.5)
CHLORIDE SERPL-SCNC: 104 MMOL/L (ref 96–112)
CO2 SERPL-SCNC: 26 MMOL/L (ref 20–33)
CREAT SERPL-MCNC: 0.44 MG/DL (ref 0.5–1.4)
EOSINOPHIL # BLD AUTO: 0.34 K/UL (ref 0–0.51)
EOSINOPHIL NFR BLD: 4.5 % (ref 0–6.9)
ERYTHROCYTE [DISTWIDTH] IN BLOOD BY AUTOMATED COUNT: 46.6 FL (ref 35.9–50)
GFR SERPL CREATININE-BSD FRML MDRD: >60 ML/MIN/1.73 M 2
GLOBULIN SER CALC-MCNC: 2.9 G/DL (ref 1.9–3.5)
GLUCOSE BLD-MCNC: 231 MG/DL (ref 65–99)
GLUCOSE BLD-MCNC: 238 MG/DL (ref 65–99)
GLUCOSE BLD-MCNC: 243 MG/DL (ref 65–99)
GLUCOSE BLD-MCNC: 335 MG/DL (ref 65–99)
GLUCOSE SERPL-MCNC: 328 MG/DL (ref 65–99)
HCT VFR BLD AUTO: 40.1 % (ref 42–52)
HGB BLD-MCNC: 12.9 G/DL (ref 14–18)
IMM GRANULOCYTES # BLD AUTO: 0.08 K/UL (ref 0–0.11)
IMM GRANULOCYTES NFR BLD AUTO: 1.1 % (ref 0–0.9)
LYMPHOCYTES # BLD AUTO: 0.41 K/UL (ref 1–4.8)
LYMPHOCYTES NFR BLD: 5.4 % (ref 22–41)
MCH RBC QN AUTO: 28.7 PG (ref 27–33)
MCHC RBC AUTO-ENTMCNC: 32.2 G/DL (ref 33.7–35.3)
MCV RBC AUTO: 89.1 FL (ref 81.4–97.8)
MONOCYTES # BLD AUTO: 0.35 K/UL (ref 0–0.85)
MONOCYTES NFR BLD AUTO: 4.6 % (ref 0–13.4)
NEUTROPHILS # BLD AUTO: 6.35 K/UL (ref 1.82–7.42)
NEUTROPHILS NFR BLD: 83.7 % (ref 44–72)
NRBC # BLD AUTO: 0 K/UL
NRBC BLD AUTO-RTO: 0 /100 WBC
PLATELET # BLD AUTO: 61 K/UL (ref 164–446)
PMV BLD AUTO: 11.2 FL (ref 9–12.9)
POTASSIUM SERPL-SCNC: 3.5 MMOL/L (ref 3.6–5.5)
PROT SERPL-MCNC: 5.9 G/DL (ref 6–8.2)
RBC # BLD AUTO: 4.5 M/UL (ref 4.7–6.1)
SIGNIFICANT IND 70042: NORMAL
SITE SITE: NORMAL
SODIUM SERPL-SCNC: 139 MMOL/L (ref 135–145)
SOURCE SOURCE: NORMAL
WBC # BLD AUTO: 7.6 K/UL (ref 4.8–10.8)

## 2017-06-25 PROCEDURE — 700111 HCHG RX REV CODE 636 W/ 250 OVERRIDE (IP): Performed by: INTERNAL MEDICINE

## 2017-06-25 PROCEDURE — 80053 COMPREHEN METABOLIC PANEL: CPT

## 2017-06-25 PROCEDURE — 82962 GLUCOSE BLOOD TEST: CPT

## 2017-06-25 PROCEDURE — 99233 SBSQ HOSP IP/OBS HIGH 50: CPT | Performed by: INTERNAL MEDICINE

## 2017-06-25 PROCEDURE — 85025 COMPLETE CBC W/AUTO DIFF WBC: CPT

## 2017-06-25 PROCEDURE — A9270 NON-COVERED ITEM OR SERVICE: HCPCS | Performed by: INTERNAL MEDICINE

## 2017-06-25 PROCEDURE — 36415 COLL VENOUS BLD VENIPUNCTURE: CPT

## 2017-06-25 PROCEDURE — 700102 HCHG RX REV CODE 250 W/ 637 OVERRIDE(OP): Performed by: INTERNAL MEDICINE

## 2017-06-25 PROCEDURE — 770009 HCHG ROOM/CARE - ONCOLOGY SEMI PRI*

## 2017-06-25 PROCEDURE — 700105 HCHG RX REV CODE 258: Performed by: INTERNAL MEDICINE

## 2017-06-25 RX ADMIN — MORPHINE SULFATE 30 MG: 30 TABLET, EXTENDED RELEASE ORAL at 08:11

## 2017-06-25 RX ADMIN — GLIMEPIRIDE 4 MG: 4 TABLET ORAL at 06:22

## 2017-06-25 RX ADMIN — NYSTATIN 500000 UNITS: 100000 SUSPENSION ORAL at 21:22

## 2017-06-25 RX ADMIN — RIVAROXABAN 15 MG: 15 TABLET, FILM COATED ORAL at 21:21

## 2017-06-25 RX ADMIN — NYSTATIN 500000 UNITS: 100000 SUSPENSION ORAL at 16:28

## 2017-06-25 RX ADMIN — LACTULOSE 30 ML: 20 SOLUTION ORAL at 08:10

## 2017-06-25 RX ADMIN — CEFTRIAXONE SODIUM 2 G: 2 INJECTION, POWDER, FOR SOLUTION INTRAMUSCULAR; INTRAVENOUS at 08:34

## 2017-06-25 RX ADMIN — LACTULOSE 30 ML: 20 SOLUTION ORAL at 21:21

## 2017-06-25 RX ADMIN — NYSTATIN 500000 UNITS: 100000 SUSPENSION ORAL at 12:23

## 2017-06-25 RX ADMIN — RIVAROXABAN 15 MG: 15 TABLET, FILM COATED ORAL at 08:34

## 2017-06-25 RX ADMIN — INSULIN LISPRO 2 UNITS: 100 INJECTION, SOLUTION INTRAVENOUS; SUBCUTANEOUS at 17:54

## 2017-06-25 RX ADMIN — INSULIN GLARGINE 20 UNITS: 100 INJECTION, SOLUTION SUBCUTANEOUS at 21:19

## 2017-06-25 RX ADMIN — Medication 220 MG: at 08:10

## 2017-06-25 RX ADMIN — INSULIN LISPRO 2 UNITS: 100 INJECTION, SOLUTION INTRAVENOUS; SUBCUTANEOUS at 12:17

## 2017-06-25 RX ADMIN — INSULIN LISPRO 4 UNITS: 100 INJECTION, SOLUTION INTRAVENOUS; SUBCUTANEOUS at 21:19

## 2017-06-25 RX ADMIN — NYSTATIN 500000 UNITS: 100000 SUSPENSION ORAL at 08:23

## 2017-06-25 RX ADMIN — INSULIN LISPRO 2 UNITS: 100 INJECTION, SOLUTION INTRAVENOUS; SUBCUTANEOUS at 08:35

## 2017-06-25 RX ADMIN — LACTULOSE 30 ML: 20 SOLUTION ORAL at 16:28

## 2017-06-25 ASSESSMENT — ENCOUNTER SYMPTOMS
BLURRED VISION: 0
NAUSEA: 0
ABDOMINAL PAIN: 1
CLAUDICATION: 0
SPEECH CHANGE: 0
NERVOUS/ANXIOUS: 0
HEARTBURN: 0
SHORTNESS OF BREATH: 0
MYALGIAS: 0
EYE PAIN: 0
HALLUCINATIONS: 0
SENSORY CHANGE: 0
PHOTOPHOBIA: 0
HEADACHES: 0
WEAKNESS: 1
CHILLS: 0
DIZZINESS: 0
COUGH: 0
DEPRESSION: 0
DIARRHEA: 0
BLOOD IN STOOL: 0
FEVER: 0

## 2017-06-25 ASSESSMENT — PAIN SCALES - GENERAL
PAINLEVEL_OUTOF10: 0
PAINLEVEL_OUTOF10: 0

## 2017-06-25 NOTE — PROGRESS NOTES
Pt. Alert and oriented up with one assist to the bathroom. Pt. Denies pain at this this time. Pt. Did sleep some of the night in the chair where he is at at this time. Pt. Does have large white patches in his mouth as noted by Dr. Thurston. Dr. Christian aware. Review POC with pt. Call light within reach hourly rounding in practice.

## 2017-06-25 NOTE — PROGRESS NOTES
[facility] Hospitalist Progress Note    Date of Service: 6/25/2017    Chief Complaint  52 y.o. male admitted 6/23/2017 with newly diagnosed pancreatic adenocarcinoa, s/p first round chemo at hospitals, presented with jaundice and confusion.    Interval Problem Update  6/24 Patient's mentation has improved since admission, still very tired and not back to himself.  GI consulted and ERCP with stenting would not be of benefit at this time.  I will resume his diabetic medications, advance diet to diabetic and resume xarelto given his sub-acute dvt/pe's.  Sat down with patient as his diagnosis is new and he and wife still have many questions that they didn't feel had been answered since they had the news.  Lavern Abbasi is their RN navigator.  6/25 Patient continuing to feel better, has difficulty with swallowing d/t sore throat and inflammation, oral cavity c/w thrush.  Anything carbonated burns his mouth - explained to him why this is - will avoid carbonated beverages until things improve.  Nystatin started.  Will resume sodium bicarb oral rinses q6h.  WBC normal and cultures without growth, will stop rocephin.    Consultants/Specialty  GI - Madhav/Karena    Disposition  Home soon        Review of Systems   Constitutional: Positive for malaise/fatigue. Negative for fever and chills.   HENT: Negative for nosebleeds.    Eyes: Negative for blurred vision, photophobia and pain.   Respiratory: Negative for cough and shortness of breath.    Cardiovascular: Negative for chest pain, claudication and leg swelling.   Gastrointestinal: Positive for abdominal pain (mild). Negative for heartburn, nausea, diarrhea, blood in stool and melena.   Genitourinary: Negative for dysuria and hematuria.   Musculoskeletal: Negative for myalgias and joint pain.   Neurological: Positive for weakness. Negative for dizziness, sensory change, speech change and headaches.   Psychiatric/Behavioral: Negative for depression and hallucinations. The patient is  not nervous/anxious.       Physical Exam  Laboratory/Imaging   Hemodynamics  Temp (24hrs), Av.6 °C (97.8 °F), Min:36.1 °C (97 °F), Max:36.9 °C (98.4 °F)   Temperature: 36.6 °C (97.9 °F)  Pulse  Av.8  Min: 77  Max: 136    Blood Pressure: 108/65 mmHg      Respiratory      Respiration: 18, Pulse Oximetry: 98 %     Work Of Breathing / Effort: Mild  RUL Breath Sounds: Clear, RML Breath Sounds: Diminished, RLL Breath Sounds: Diminished, KITTY Breath Sounds: Clear, LLL Breath Sounds: Diminished    Fluids    Intake/Output Summary (Last 24 hours) at 17 1326  Last data filed at 17 0418   Gross per 24 hour   Intake   1060 ml   Output    500 ml   Net    560 ml       Nutrition  Orders Placed This Encounter   Procedures   • DIET ORDER     Standing Status: Standing      Number of Occurrences: 1      Standing Expiration Date:      Order Specific Question:  Diet:     Answer:  Diabetic [3]     Order Specific Question:  Diet:     Answer:  Regular [1]     Physical Exam   Constitutional: He is oriented to person, place, and time. He appears well-developed and well-nourished.   HENT:   Head: Normocephalic and atraumatic.   Eyes: Conjunctivae are normal.   Neck: Neck supple. No JVD present.   Cardiovascular: Normal rate, regular rhythm and normal heart sounds.  Exam reveals no gallop and no friction rub.    No murmur heard.  Pulmonary/Chest: Effort normal and breath sounds normal. No respiratory distress. He exhibits no tenderness.   Abdominal: Soft. Bowel sounds are normal. He exhibits no distension and no mass. There is no tenderness.   Musculoskeletal: He exhibits no edema or tenderness.   Neurological: He is alert and oriented to person, place, and time. No cranial nerve deficit.   Skin: Skin is warm and dry. No rash noted. He is not diaphoretic.   Psychiatric: He has a normal mood and affect. His behavior is normal.   Nursing note and vitals reviewed.      Recent Labs      17   0904  17   0210   06/25/17   1044   WBC  13.3*  15.0*  7.6   RBC  4.69*  4.69*  4.50*   HEMOGLOBIN  13.8*  13.4*  12.9*   HEMATOCRIT  42.4  41.7*  40.1*   MCV  90.4  88.9  89.1   MCH  29.4  28.6  28.7   MCHC  32.5*  32.1*  32.2*   RDW  48.1  47.3  46.6   PLATELETCT  105*  105*  61*   MPV  11.0  10.3  11.2     Recent Labs      06/23/17   0904  06/24/17   0210  06/25/17   1044   SODIUM  141  141  139   POTASSIUM  4.2  3.8  3.5*   CHLORIDE  102  107  104   CO2  17*  19*  26   GLUCOSE  350*  208*  328*   BUN  29*  19  15   CREATININE  0.73  0.58  0.44*   CALCIUM  9.3  9.0  8.6     Recent Labs      06/23/17   0904   INR  1.37*                  Assessment/Plan     * Encephalopathy  Assessment & Plan  Likely multifactorial  Improved mentation      Pancreatic cancer metastasized to liver (CMS-HCC) (present on admission)  Assessment & Plan  Oncology - Dr Moss  First chemo last week, had diarrhea afterward        Obstructive jaundice  Assessment & Plan  Stenting would be of little benefit as no dilated area that could be relieved  T bili trending down with hydration alone  Mentation improved      Diabetes (CMS-HCC)  Assessment & Plan  Diabetic diet  Resume glimepride, lantus, Jardiance  SSI as needed      Leukocytosis  Assessment & Plan  No evidence infection, empiric rocephin started  WBC normal - stop rocephin  Cultures negative.  Likely d/t chemo      Diarrhea  Assessment & Plan  PRN imodium  D/t chemo      Thrombocytopenia (CMS-HCC)  Assessment & Plan  Chemo plus hepatic involvement with cancer      Transaminitis  Assessment & Plan  D/t hepatic invasion of cancer      Elevated alkaline phosphatase level  Assessment & Plan  Hepatic involvement of cancer      Pulmonary emboli (CMS-HCC)  Assessment & Plan  Resume xarelto as no surgical/procedural intervention planned at this time      Thrush  Assessment & Plan  Nystatin swish and swallow  D/w Dr Thurston      Labs reviewed, Medications reviewed and Radiology images reviewed  Bennett catheter: No  Bennett      DVT: xarelto.      Antibiotics: Treating active infection/contamination beyond 24 hours perioperative coverage

## 2017-06-25 NOTE — CARE PLAN
Problem: Safety  Goal: Will remain free from falls  Intervention: Assess risk factors for falls  Pt. At high risk for falls bed alarm in place      Problem: Pain Management  Goal: Pain level will decrease to patient’s comfort goal  Intervention: Follow pain managment plan developed in collaboration with patient and Interdisciplinary Team  Pt. Denies pain at this time. Will continue to monitor for pain

## 2017-06-25 NOTE — PROGRESS NOTES
Gastroenterology Progress Note     Author: Alexander Thurston   Date & Time Created: 6/25/2017 7:31 AM    Interval History:  Problem: Jaundice, metastatic pancreatic cancer    This is a 52 year old with stage 4 pancreatic cancer with multiple liver metastasis who we were asked to see for jaundice.  - CT scan: numerous liver mets, nonocclusive thrombus in portal confluence, new spleen lesion (infarct vs met?), pancreatic mass, distended gallbladder w/o stones.  - US distended gallbladder without stones or sludge, nonspecific gallbladder wall thickening, hepatomegaly with numerous mets, 0.7 cm CBD (only mildly distended).    Course:  6/24/2017 He is tired, but denies abdominal pain, nausea and vomiting.  6/25/2017 He complains of a sore throat which makes it difficult to swallow.  Denies abdominal pain.    Review of Systems:  ROS    Physical Exam:  Physical Exam   Constitutional: He is oriented to person, place, and time.   HENT:   Mouth/Throat: Oropharyngeal exudate (White patches suggesting oral thrush.) present.   Cardiovascular: Normal rate and regular rhythm.    No murmur heard.  Pulmonary/Chest: Effort normal and breath sounds normal. He has no wheezes. He has no rales.   Abdominal: Soft. Bowel sounds are normal. He exhibits no mass. There is no tenderness.   Neurological: He is alert and oriented to person, place, and time.       Labs:        Invalid input(s): [id number]      Recent Labs      06/23/17   0904  06/24/17 0210   SODIUM  141  141   POTASSIUM  4.2  3.8   CHLORIDE  102  107   CO2  17*  19*   BUN  29*  19   CREATININE  0.73  0.58   MAGNESIUM   --   2.3   CALCIUM  9.3  9.0     Recent Labs      06/23/17   0904  06/24/17   0210   ALTSGPT  250*  191*   ASTSGOT  168*  97*   ALKPHOSPHAT  907*  871*   TBILIRUBIN  6.1*  4.8*   GLUCOSE  350*  208*     Recent Labs      06/23/17   0904  06/24/17   0210   RBC  4.69*  4.69*   HEMOGLOBIN  13.8*  13.4*   HEMATOCRIT  42.4  41.7*   PLATELETCT  105*  105*    PROTHROMBTM  17.3*   --    INR  1.37*   --      Recent Labs      17   0904  17   0210   WBC  13.3*  15.0*   NEUTSPOLYS  95.60*  91.90*   LYMPHOCYTES  1.70*  4.60*   MONOCYTES  1.70  1.90   EOSINOPHILS  0.20  0.70   BASOPHILS  0.20  0.30   ASTSGOT  168*  97*   ALTSGPT  250*  191*   ALKPHOSPHAT  907*  871*   TBILIRUBIN  6.1*  4.8*     Hemodynamics:  Temp (24hrs), Av.5 °C (97.7 °F), Min:36.1 °C (97 °F), Max:36.9 °C (98.4 °F)  Temperature: 36.1 °C (97 °F)  Pulse  Av.4  Min: 77  Max: 136   Blood Pressure: 106/60 mmHg     Respiratory:    Respiration: 18, Pulse Oximetry: 92 %     Work Of Breathing / Effort: Mild  RUL Breath Sounds: Diminished, RML Breath Sounds: Diminished, RLL Breath Sounds: Diminished, KITTY Breath Sounds: Diminished, LLL Breath Sounds: Diminished  Fluids:    Intake/Output Summary (Last 24 hours) at 17 0731  Last data filed at 17 0418   Gross per 24 hour   Intake   1060 ml   Output    500 ml   Net    560 ml       GI/Nutrition:  Orders Placed This Encounter   Procedures   • DIET ORDER     Standing Status: Standing      Number of Occurrences: 1      Standing Expiration Date:      Order Specific Question:  Diet:     Answer:  Diabetic [3]     Order Specific Question:  Diet:     Answer:  Regular [1]     Medical Decision Making, by Problem:  Active Hospital Problems    Diagnosis   • *Encephalopathy [G93.40]   • Obstructive jaundice [K83.8]   • Diabetes (CMS-HCC) [E11.9]   • Leukocytosis [D72.829]   • Diarrhea [R19.7]   • Thrombocytopenia (CMS-HCC) [D69.6]   • Transaminitis [R74.0]   • Elevated alkaline phosphatase level [R74.8]   • Pulmonary emboli (CMS-HCC) [I26.99]   • Pancreatic cancer metastasized to liver (CMS-HCC) [C25.9, C78.7]     Impression:  1. Jaundice due to liver metastasis and compression of small ducts.  Possibly there was a little swelling related to initial chemotherapy.  There is no evidence of extrahepatic duct obstruction, therefore ERCP with stenting  wouldn't help.  2. Metastatic pancreatic cancer.  3. Leukocytosis.  No infection found.  This may be stress leukemoid reaction.  4. Oral thrush.  5. Odynophagia - may have esophageal candidiasis as well.  5. History of DVT/PE.  6. Gallbladder wall edema due to portal hypertension from liver disease.  7. New splenic lesion, infarct versus met.  8. DM.    Plan:  - Oral ketoconazole troches initially (diflucan if poor response noted); discussed with Dr. Christian.  - I will sign off.  Please call if we could be of further assistance.    Core Measures

## 2017-06-25 NOTE — PROGRESS NOTES
Pt. Alert and oriented up with one person assist. Family at bedside. Pt. And family anxious this morning. Dr. Christian did talk to the pt. About his cancer. Pt. And family were very thankful for her time explaining the situation. Pt. Did get a shower this am tolerated well. Reviewed POC with pt. Call light within reach hourly rounding in practice.

## 2017-06-25 NOTE — PROGRESS NOTES
Assumed care of patient, received report from day RN.  Communication board updated and reviewed with pt.  Pt states that his wife will be back shortly and she will be staying the night. Cot is all setup for her. Bed alarm on and in good working order.  Pt denies pain at this time. Assessment complete. No other needs at this time. Hourly rounding in place. Call light and personal belongings within reach.

## 2017-06-25 NOTE — CARE PLAN
Problem: Communication  Goal: The ability to communicate needs accurately and effectively will improve  Intervention: [place] patient and significant other/support system to call light to alert staff of needs  Instructed pt. And family how to call for assistance      Problem: Safety  Goal: Will remain free from falls  Intervention: Assess risk factors for falls  Bed alarm in place. Pt. Is high risk for falls

## 2017-06-25 NOTE — CARE PLAN
Problem: Safety  Goal: Will remain free from injury  Bed alarm on and in good working order. Non slip socks on when out of bed. Bed in the lowest locked position. Wife decided not to stay the night, pt educated on the importance to call when he needs help.     Problem: Discharge Barriers/Planning  Goal: Patient’s continuum of care needs will be met  Pt is awaiting a PT eval, OT eval, and speech eval. Pt has oxygen set up at home.

## 2017-06-26 ENCOUNTER — APPOINTMENT (OUTPATIENT)
Dept: HEMATOLOGY ONCOLOGY | Facility: MEDICAL CENTER | Age: 52
End: 2017-06-26
Payer: COMMERCIAL

## 2017-06-26 VITALS
SYSTOLIC BLOOD PRESSURE: 106 MMHG | BODY MASS INDEX: 37.4 KG/M2 | OXYGEN SATURATION: 100 % | TEMPERATURE: 97.7 F | DIASTOLIC BLOOD PRESSURE: 50 MMHG | HEART RATE: 98 BPM | WEIGHT: 282.19 LBS | HEIGHT: 73 IN | RESPIRATION RATE: 18 BRPM

## 2017-06-26 LAB
ALBUMIN SERPL BCP-MCNC: 2.9 G/DL (ref 3.2–4.9)
ALBUMIN/GLOB SERPL: 1 G/DL
ALP SERPL-CCNC: 765 U/L (ref 30–99)
ALT SERPL-CCNC: 102 U/L (ref 2–50)
ANION GAP SERPL CALC-SCNC: 7 MMOL/L (ref 0–11.9)
AST SERPL-CCNC: 49 U/L (ref 12–45)
BASOPHILS # BLD AUTO: 0.9 % (ref 0–1.8)
BASOPHILS # BLD: 0.07 K/UL (ref 0–0.12)
BILIRUB SERPL-MCNC: 4.7 MG/DL (ref 0.1–1.5)
BUN SERPL-MCNC: 14 MG/DL (ref 8–22)
CALCIUM SERPL-MCNC: 8.7 MG/DL (ref 8.5–10.5)
CHLORIDE SERPL-SCNC: 101 MMOL/L (ref 96–112)
CO2 SERPL-SCNC: 27 MMOL/L (ref 20–33)
CREAT SERPL-MCNC: 0.35 MG/DL (ref 0.5–1.4)
EOSINOPHIL # BLD AUTO: 0.42 K/UL (ref 0–0.51)
EOSINOPHIL NFR BLD: 5.2 % (ref 0–6.9)
ERYTHROCYTE [DISTWIDTH] IN BLOOD BY AUTOMATED COUNT: 45.7 FL (ref 35.9–50)
GFR SERPL CREATININE-BSD FRML MDRD: >60 ML/MIN/1.73 M 2
GLOBULIN SER CALC-MCNC: 3 G/DL (ref 1.9–3.5)
GLUCOSE BLD-MCNC: 216 MG/DL (ref 65–99)
GLUCOSE BLD-MCNC: 262 MG/DL (ref 65–99)
GLUCOSE SERPL-MCNC: 270 MG/DL (ref 65–99)
HCT VFR BLD AUTO: 38.7 % (ref 42–52)
HGB BLD-MCNC: 12.7 G/DL (ref 14–18)
LYMPHOCYTES # BLD AUTO: 0.21 K/UL (ref 1–4.8)
LYMPHOCYTES NFR BLD: 2.6 % (ref 22–41)
MANUAL DIFF BLD: NORMAL
MCH RBC QN AUTO: 29.1 PG (ref 27–33)
MCHC RBC AUTO-ENTMCNC: 32.8 G/DL (ref 33.7–35.3)
MCV RBC AUTO: 88.6 FL (ref 81.4–97.8)
MONOCYTES # BLD AUTO: 0 K/UL (ref 0–0.85)
MONOCYTES NFR BLD AUTO: 0 % (ref 0–13.4)
MORPHOLOGY BLD-IMP: NORMAL
NEUTROPHILS # BLD AUTO: 7.3 K/UL (ref 1.82–7.42)
NEUTROPHILS NFR BLD: 91.3 % (ref 44–72)
NRBC # BLD AUTO: 0 K/UL
NRBC BLD AUTO-RTO: 0 /100 WBC
PLATELET # BLD AUTO: 57 K/UL (ref 164–446)
PLATELET BLD QL SMEAR: NORMAL
PMV BLD AUTO: 11.8 FL (ref 9–12.9)
POTASSIUM SERPL-SCNC: 3.4 MMOL/L (ref 3.6–5.5)
PROT SERPL-MCNC: 5.9 G/DL (ref 6–8.2)
RBC # BLD AUTO: 4.37 M/UL (ref 4.7–6.1)
RBC BLD AUTO: NORMAL
SODIUM SERPL-SCNC: 135 MMOL/L (ref 135–145)
WBC # BLD AUTO: 8 K/UL (ref 4.8–10.8)

## 2017-06-26 PROCEDURE — 85007 BL SMEAR W/DIFF WBC COUNT: CPT

## 2017-06-26 PROCEDURE — 700102 HCHG RX REV CODE 250 W/ 637 OVERRIDE(OP): Performed by: INTERNAL MEDICINE

## 2017-06-26 PROCEDURE — 36415 COLL VENOUS BLD VENIPUNCTURE: CPT

## 2017-06-26 PROCEDURE — A9270 NON-COVERED ITEM OR SERVICE: HCPCS | Performed by: INTERNAL MEDICINE

## 2017-06-26 PROCEDURE — 80053 COMPREHEN METABOLIC PANEL: CPT

## 2017-06-26 PROCEDURE — 85027 COMPLETE CBC AUTOMATED: CPT

## 2017-06-26 PROCEDURE — 82962 GLUCOSE BLOOD TEST: CPT

## 2017-06-26 PROCEDURE — 99239 HOSP IP/OBS DSCHRG MGMT >30: CPT | Performed by: INTERNAL MEDICINE

## 2017-06-26 RX ADMIN — NYSTATIN 500000 UNITS: 100000 SUSPENSION ORAL at 07:40

## 2017-06-26 RX ADMIN — GLIMEPIRIDE 4 MG: 4 TABLET ORAL at 07:41

## 2017-06-26 RX ADMIN — INSULIN LISPRO 3 UNITS: 100 INJECTION, SOLUTION INTRAVENOUS; SUBCUTANEOUS at 12:24

## 2017-06-26 RX ADMIN — Medication 220 MG: at 07:41

## 2017-06-26 RX ADMIN — RIVAROXABAN 15 MG: 15 TABLET, FILM COATED ORAL at 07:40

## 2017-06-26 ASSESSMENT — PAIN SCALES - GENERAL
PAINLEVEL_OUTOF10: 9
PAINLEVEL_OUTOF10: 9

## 2017-06-26 NOTE — PROGRESS NOTES
Oncology/Hematology Progress Note               Author: Marlon Moss Date & Time created: 6/26/2017  7:42 AM     CC Pancreatic mass with liver mets  Interval History:  Mentation improved    Review of Systems:  ROS Constitutional: No fever, chills, positive for weight loss ,malaise/fatigue.    HEENT: No new auditory or visual complaints. No sore throat and neck pain.     Respiratory:No new cough, sputum production, shortness of breath and wheezing.     Cardiovascular: No new chest pain, palpitations, orthopnea and leg swelling.    Gastrointestinal: As above. No, hematochezia or melena     Genitourinary: Negative for dysuria, hematuria    Musculoskeletal: No new arthralgias or myalgias    Skin: Negative for rash and itching.    Neurological: Negative for focal weakness or headaches.    Endo/Heme/Allergies: No abnormal bleed/bruise.    Psychiatric/Behavioral: No new depression/anxiety    Physical Exam:  Physical Exam General: Not in acute distress, alert and oriented x 3, obese  HEENT: No pallor, icterus. Oropharynx clear.    Neck: Supple, no palpable masses.  Lymph nodes: No palpable cervical, supraclavicular, axillary or inguinal lymphadenopathy.     CVS: regular rate and rhythm, no rubs or gallops  RESP: Clear to auscultate bilaterally, no wheezing or crackles.    ABD: Soft, non tender, non distended, positive bowel sounds, no palpable organomegaly  EXT: No edema or cyanosis  CNS: Alert and oriented x3, No focal deficits.  Skin- No rash    Labs:        Invalid input(s): [id number]      Recent Labs      06/24/17 0210 06/25/17   1044  06/26/17   0359   SODIUM  141  139  135   POTASSIUM  3.8  3.5*  3.4*   CHLORIDE  107  104  101   CO2  19*  26  27   BUN  19  15  14   CREATININE  0.58  0.44*  0.35*   MAGNESIUM  2.3   --    --    CALCIUM  9.0  8.6  8.7     Recent Labs      06/24/17 0210  06/25/17   1044  06/26/17   0359   ALTSGPT  191*  120*  102*   ASTSGOT  97*  57*  49*   ALKPHOSPHAT  871*  798*  765*    TBILIRUBIN  4.8*  4.6*  4.7*   GLUCOSE  208*  328*  270*     Recent Labs      17   0904  17   0210  17   1044  17   0359   RBC  4.69*  4.69*  4.50*  4.37*   HEMOGLOBIN  13.8*  13.4*  12.9*  12.7*   HEMATOCRIT  42.4  41.7*  40.1*  38.7*   PLATELETCT  105*  105*  61*  57*   PROTHROMBTM  17.3*   --    --    --    INR  1.37*   --    --    --      Recent Labs      17   10417   0359   WBC  15.0*  7.6  8.0   NEUTSPOLYS  91.90*  83.70*  91.30*   LYMPHOCYTES  4.60*  5.40*  2.60*   MONOCYTES  1.90  4.60  0.00   EOSINOPHILS  0.70  4.50  5.20   BASOPHILS  0.30  0.70  0.90   ASTSGOT  97*  57*  49*   ALTSGPT  191*  120*  102*   ALKPHOSPHAT  871*  798*  765*   TBILIRUBIN  4.8*  4.6*  4.7*     Recent Labs      17   02117   1044  17   0359   SODIUM  141  139  135   POTASSIUM  3.8  3.5*  3.4*   CHLORIDE  107  104  101   CO2  19*  26  27   GLUCOSE  208*  328*  270*   BUN  19  15  14   CREATININE  0.58  0.44*  0.35*   CALCIUM  9.0  8.6  8.7     Hemodynamics:  Temp (24hrs), Av.6 °C (97.9 °F), Min:36.2 °C (97.2 °F), Max:37.2 °C (98.9 °F)  Temperature: 36.4 °C (97.6 °F)  Pulse  Av.8  Min: 77  Max: 136   Blood Pressure: 116/54 mmHg     Respiratory:    Respiration: 18, Pulse Oximetry: 95 %     Work Of Breathing / Effort: Mild  RUL Breath Sounds: Clear, RML Breath Sounds: Diminished, RLL Breath Sounds: Diminished, KITTY Breath Sounds: Clear, LLL Breath Sounds: Diminished  Fluids:  No intake or output data in the 24 hours ending 17 0742    GI/Nutrition:  Orders Placed This Encounter   Procedures   • DIET ORDER     Standing Status: Standing      Number of Occurrences: 1      Standing Expiration Date:      Order Specific Question:  Diet:     Answer:  Diabetic [3]     Medical Decision Making, by Problem:  Active Hospital Problems    Diagnosis   • *Encephalopathy [G93.40]   • Thrush [B37.0]   • Obstructive jaundice [K83.8]   • Diabetes (CMS-HCC) [E11.9]    • Leukocytosis [D72.829]   • Diarrhea [R19.7]   • Thrombocytopenia (CMS-HCC) [D69.6]   • Transaminitis [R74.0]   • Elevated alkaline phosphatase level [R74.8]   • Pulmonary emboli (CMS-HCC) [I26.99]   • Pancreatic cancer metastasized to liver (CMS-HCC) [C25.9, C78.7]       Plan:  metastatic pancreatic adenocarcinoma - C1 d15 due on 7/2 . May need dose modification depending on LFTs. Check CBC Wed and Thursday locally. Appt next week for possible chemo  Elev T bili- Tumor burden/chemo related? Monitor LFts we Fri  Decompensated LFTs/ MS changes improving. Wean opiods depending on pain control.s/p Celiac plexus block    Core Measures

## 2017-06-26 NOTE — CARE PLAN
Problem: Venous Thromboembolism (VTW)/Deep Vein Thrombosis (DVT) Prevention:  Goal: Patient will participate in Venous Thrombosis (VTE)/Deep Vein Thrombosis (DVT)Prevention Measures  Outcome: PROGRESSING AS EXPECTED    Problem: Pain Management  Goal: Pain level will decrease to patient’s comfort goal  Outcome: PROGRESSING SLOWER THAN EXPECTED  Pt refusing MS contin and other pain medications despite pain in his back of an 8/10. Ice pack applied per request. Frequent assessment of pain in place.

## 2017-06-26 NOTE — PROGRESS NOTES
Pt alert and oriented x 4. Denies nausea but complains of pain of an 8/10 in his back. Refusing medication intervention. Up with one person assist. Up to a chair. PIV to R wrist saline locked. Bowel sound hyperactive. Bilateral lower extremity edema noted. Blood sugars continue to be high. Pt educated regarding adherence to a diabetic diet. Family bringing in fruit and high sugar items despite education. Pt resting at this time. Wife at the bedside. Call light within reach. Hourly rounding in place.

## 2017-06-26 NOTE — ADDENDUM NOTE
Encounter addended by: Jyoti Puckett on: 6/26/2017  1:14 PM<BR>     Documentation filed: Clinical Notes

## 2017-06-26 NOTE — PROGRESS NOTES
Pt provided with all discharge instructions including home medications, follow up appointments and when to seek medical attention. All questions answered. Pt off unit via wheelchair with wife for discharge home in private vehicle.

## 2017-06-26 NOTE — THERAPY
PT orders received, Eval not completed as pt has no acute skilled PT needs. Pt has been up self on the unit, currently in shower upon PT arrival. Spouse reports no acute PT needs at this time. Discussed use of SPC as needed for balance to assist mobliity and return to PLOF. Orders D/C'ed    mi king, PT, DPT  Pager: [phone]

## 2017-06-26 NOTE — CARE PLAN
Problem: Safety  Goal: Will remain free from injury  Outcome: PROGRESSING AS EXPECTED  Provide assistance with mobility    Problem: Pain Management  Goal: Pain level will decrease to patient’s comfort goal  Outcome: PROGRESSING AS EXPECTED  Medicate pt to comfort goal per mar

## 2017-06-26 NOTE — DOCUMENTATION QUERY
PROVIDERS: Please select “Cosign w/ note”to reply to query.    To better represent the severity of illness of your patient, please review the following information and exercise your independent professional judgment in responding to this query.       · Primary Malignant Tumor  · Malignant Secondary Tumor  · Benign Tumor  · Ca in situ  · Uncertain Behavior  · Unspecified Behavior  · Unable to Determine    Other explanation of clinical findings is documented in the Pathology Reports. Based upon the clinical findings, risk factors, and treatment, can this diagnosis be further specified?            Thank you,    Jyoti Puckett

## 2017-06-26 NOTE — DISCHARGE INSTRUCTIONS
Jaundice, Adult  Jaundice is a yellowish discoloration of the skin, the whites of the eyes, and mucous membranes. Jaundice can be a sign that the liver or the bile system is not working normally.  CAUSES  This condition is caused by an increased level of bilirubin in the blood. Bilirubin is a substance that is produced by the normal breakdown of red blood cells. Conditions and activities that can cause an increase in the bilirubin level include:  · Viral hepatitis.  · Gallstones or other conditions, such as a tumor, that can cause a blockage of bile ducts.  · Excessive use of alcohol.  · Other liver diseases, such as cirrhosis.  · Certain cancers.  · Certain infections.  · Certain genetic syndromes.  · Certain medicines.  SYMPTOMS  Symptoms of this condition include:  1. Yellow color to the skin, the whites of the eyes, or mucous membranes.  2. Dark brown urine.  3. Stomach pain.  4. Light or carlos-colored stool.  5. Itchy skin (pruritus).  DIAGNOSIS  This condition is diagnosed with a medical history, physical exam, and blood tests. You may have additional tests to determine what is causing your bilirubin level to increase.  TREATMENT  Treatment for jaundice depends on the underlying condition. Treatment may include:  1. Stopping the use of a certain medicine.  2. Fluids that are given through an IV tube that is inserted into one of your veins.  3. Medicines to treat pruritus.  4. Surgery, if there is blockage of the bile ducts.  HOME CARE INSTRUCTIONS  1. Drink enough fluid to keep your urine clear or pale yellow.  2. Do not drink alcohol.  3. Take medicines only as directed by your health care provider.  4. Keep all follow-up visits as directed by your health care provider. This is important.  5. You may use skin lotion to relieve itching.  SEEK MEDICAL CARE IF:  1. You have a fever.  SEEK IMMEDIATE MEDICAL CARE IF:  · Your symptoms suddenly get worse.  · You have symptoms for more than 72 hours.  · Your pain gets  worse.  · You vomit repeatedly.  · You become weak or confused.  · You develop a severe headache.  · You become severely dehydrated. Signs of severe dehydration include:  ¨ A very dry mouth.  ¨ A rapid, weak pulse.  ¨ Rapid breathing.  ¨ Blue lips.     This information is not intended to replace advice given to you by your health care provider. Make sure you discuss any questions you have with your health care provider.     Document Released: 12/18/2006 Document Revised: 05/03/2016 Document Reviewed: 12/14/2015  [employer] Interactive Patient Education ©2016 [employer]    · Metabolic encephalopathy  • Hepatic encephalopathy  • Toxic encephalopathy  • Hypertensive encephalopathy  • Other type of encephalopathy  • Other explanation of clinical findings  • Unable to determine      Discharge Instructions    Discharged to home by car with relative. Discharged via wheelchair, hospital escort: Yes.  Special equipment needed: Not Applicable    Be sure to schedule a follow-up appointment with your primary care doctor or any specialists as instructed.     Discharge Plan:   Diet Plan: Discussed  Activity Level: Discussed  Confirmed Follow up Appointment: Patient to Call and Schedule Appointment  Confirmed Symptoms Management: Discussed  Medication Reconciliation Updated: Yes  Influenza Vaccine Indication: Patient Refuses    I understand that a diet low in cholesterol, fat, and sodium is recommended for good health. Unless I have been given specific instructions below for another diet, I accept this instruction as my diet prescription.   Other diet: Diabetic diet    Special Instructions: None    · Is patient discharged on Warfarin / Coumadin?   No     · Is patient Post Blood Transfusion?  No    Depression / Suicide Risk    As you are discharged from this [facility] facility, it is important to learn how to keep safe from harming yourself.    Recognize the warning signs:  · Abrupt changes in personality, positive or  negative- including increase in energy   · Giving away possessions  · Change in eating patterns- significant weight changes-  positive or negative  · Change in sleeping patterns- unable to sleep or sleeping all the time   · Unwillingness or inability to communicate  · Depression  · Unusual sadness, discouragement and loneliness  · Talk of wanting to die  · Neglect of personal appearance   · Rebelliousness- reckless behavior  · Withdrawal from people/activities they love  · Confusion- inability to concentrate     If you or a loved one observes any of these behaviors or has concerns about self-harm, here's what you can do:  · Talk about it- your feelings and reasons for harming yourself  · Remove any means that you might use to hurt yourself (examples: pills, rope, extension cords, firearm)  · Get professional help from the community (Mental Health, Substance Abuse, psychological counseling)  · Do not be alone:Call your Safe Contact- someone whom you trust who will be there for you.  · Call your local CRISIS HOTLINE 274-4063 or 696-311-3843  · Call your local Children's Mobile Crisis Response Team [place] (247) 873-5881 or www.Mtivity  · Call the toll free National Suicide Prevention Hotlines   · National Suicide Prevention Lifeline 732-365-NVDN (9760)  · National Hope Line Network 800-SUICIDE (851-6729)

## 2017-06-27 ENCOUNTER — PATIENT OUTREACH (OUTPATIENT)
Dept: HEALTH INFORMATION MANAGEMENT | Facility: OTHER | Age: 52
End: 2017-06-27

## 2017-06-27 NOTE — DISCHARGE SUMMARY
DATE OF ADMISSION:  06/23/2017    DATE OF DISCHARGE:  06/26/2017    DISCHARGE DIAGNOSES:  Obstructive jaundice secondary to metastatic pancreatic   cancer to the liver, encephalopathy, which has since resolved, leukocytosis   without evidence of infection, which has since resolved, diabetes, diarrhea   secondary to chemotherapy, thrombocytopenia, transaminitis, elevated alkaline   phosphatase, pulmonary emboli, on anticoagulation.    REASON FOR HOSPITALIZATION:  Patient is a 52-year-old male who came in with   increased belly pain and jaundice, has a history of metastatic pancreatic   adenocarcinoma diagnosed 3 months prior.  Lives in [place] , was feeling   poor, went to [facility], had some IV fluids and then was   discharged home.  Continued to feel weak and had increasing yellowing of the   skin and patient's oncologist was called and he told him to come into the   emergency room.  Patient was found to have an elevated bilirubin and a   possible obstruction and so gastroenterology was consulted.  No prior history   of this before.    HOSPITAL COURSE:  Patient was admitted.  He was seen by GI, Dr. Corey and   with further evaluation, no obvious evidence of ductal dilatation therefore   stenting and ERCP would be of no benefit.  Gastroenterology recommending   continued conservative care.  Patient did have a drop of his bilirubin from 6   down to 4.7 on day of discharge.  His confusion has since resolved and his   mentation has improved.  He is still fairly weak, but has made significant   gains since hospitalization.  Blood cultures were drawn, which showed no   growth, same thing with urine culture.  On day of discharge, patient's white   count 8, hemoglobin 12.7, hematocrit 38.7 and platelets of 57.  Patient's AST   49, , alkaline phosphatase 758 and total bilirubin 4.7.  Patient will   continue on his home medications and also did add nystatin secondary to   thrush.  He was starting  to show some improvement.  Patient will follow up   with labs on Wednesday and Friday with both CMP and CBC and to be send to Dr. Moss and he will follow up as scheduled the following week with oncology for   the next round of chemotherapy.  At this point, he is appropriate for   discharge home.    DISCHARGE MEDICATIONS:  Nystatin 5 mL q.i.d. for 14 days.  Resume Lasix 40 mg   twice daily, Glimepiride 4 mg p.o. b.i.d., Lantus 20 units at bedtime,   Jardiance 25 mg p.o. daily, EMLA cream as needed for access of this port,   Imodium as needed for diarrhea, metformin 500 mg twice daily, MS Contin 30 mg   every 12 hours, niacin 500 mg twice daily, Zofran 4 mg every 4 hours p.r.n.   nausea and vomiting, immediate release oxycodone 10 mg every 4 hours p.r.n.   pain, potassium 20 mEq p.o. daily, Compazine 10 mg p.o. every 6 hours p.r.n.   nausea and vomiting, tramadol  mg every 6 hours as needed for mild pain,   and Xarelto 15 mg p.o. b.i.d., transitioning to 20 mg p.o. q. dinner on July 4th.    DIET:  As tolerated.    ACTIVITY:  As tolerated.    Discharge instructions explained to the patient and wife, agreeable with   discharge.  Discharge process lasted approximately 35 minutes.       ____________________________________     DO XENA Cooley / AMENA    DD:  06/26/2017 20:23:40  DT:  06/26/2017 23:25:23    D#:  [id number]  Job#:  [id number]

## 2017-06-28 LAB
BACTERIA BLD CULT: NORMAL
BACTERIA BLD CULT: NORMAL
SIGNIFICANT IND 70042: NORMAL
SIGNIFICANT IND 70042: NORMAL
SITE SITE: NORMAL
SITE SITE: NORMAL
SOURCE SOURCE: NORMAL
SOURCE SOURCE: NORMAL

## 2017-06-30 DIAGNOSIS — C78.7 PANCREATIC CANCER METASTASIZED TO LIVER (HCC): ICD-10-CM

## 2017-06-30 DIAGNOSIS — C25.9 PANCREATIC CANCER METASTASIZED TO LIVER (HCC): ICD-10-CM

## 2017-07-03 ENCOUNTER — HOSPITAL ENCOUNTER (OUTPATIENT)
Facility: MEDICAL CENTER | Age: 52
End: 2017-07-03
Attending: NURSE PRACTITIONER
Payer: COMMERCIAL

## 2017-07-03 ENCOUNTER — HOSPITAL ENCOUNTER (INPATIENT)
Facility: MEDICAL CENTER | Age: 52
LOS: 2 days | DRG: 435 | End: 2017-07-05
Attending: HOSPITALIST | Admitting: FAMILY MEDICINE
Payer: COMMERCIAL

## 2017-07-03 ENCOUNTER — OFFICE VISIT (OUTPATIENT)
Dept: HEMATOLOGY ONCOLOGY | Facility: MEDICAL CENTER | Age: 52
End: 2017-07-03
Payer: COMMERCIAL

## 2017-07-03 ENCOUNTER — APPOINTMENT (OUTPATIENT)
Dept: RADIOLOGY | Facility: MEDICAL CENTER | Age: 52
DRG: 435 | End: 2017-07-03
Attending: FAMILY MEDICINE
Payer: COMMERCIAL

## 2017-07-03 ENCOUNTER — RESOLUTE PROFESSIONAL BILLING HOSPITAL PROF FEE (OUTPATIENT)
Dept: HOSPITALIST | Facility: MEDICAL CENTER | Age: 52
End: 2017-07-03
Payer: COMMERCIAL

## 2017-07-03 ENCOUNTER — NON-PROVIDER VISIT (OUTPATIENT)
Dept: HEMATOLOGY ONCOLOGY | Facility: MEDICAL CENTER | Age: 52
End: 2017-07-03
Payer: COMMERCIAL

## 2017-07-03 ENCOUNTER — APPOINTMENT (OUTPATIENT)
Dept: ONCOLOGY | Facility: MEDICAL CENTER | Age: 52
End: 2017-07-03
Attending: INTERNAL MEDICINE
Payer: COMMERCIAL

## 2017-07-03 VITALS
OXYGEN SATURATION: 95 % | HEART RATE: 96 BPM | BODY MASS INDEX: 38.41 KG/M2 | WEIGHT: 289.79 LBS | TEMPERATURE: 98.1 F | HEIGHT: 73 IN | RESPIRATION RATE: 14 BRPM | DIASTOLIC BLOOD PRESSURE: 78 MMHG | SYSTOLIC BLOOD PRESSURE: 118 MMHG

## 2017-07-03 VITALS
RESPIRATION RATE: 14 BRPM | HEIGHT: 73 IN | TEMPERATURE: 98.1 F | WEIGHT: 289.79 LBS | BODY MASS INDEX: 38.41 KG/M2 | OXYGEN SATURATION: 95 % | SYSTOLIC BLOOD PRESSURE: 118 MMHG | HEART RATE: 96 BPM | DIASTOLIC BLOOD PRESSURE: 78 MMHG

## 2017-07-03 DIAGNOSIS — C78.7 PANCREATIC CANCER METASTASIZED TO LIVER (HCC): ICD-10-CM

## 2017-07-03 DIAGNOSIS — C25.9 PANCREATIC CANCER METASTASIZED TO LIVER (HCC): ICD-10-CM

## 2017-07-03 LAB
ALBUMIN SERPL BCP-MCNC: 2.7 G/DL (ref 3.2–4.9)
ALBUMIN/GLOB SERPL: 0.9 G/DL
ALP SERPL-CCNC: 965 U/L (ref 30–99)
ALT SERPL-CCNC: 67 U/L (ref 2–50)
ANION GAP SERPL CALC-SCNC: 13 MMOL/L (ref 0–11.9)
AST SERPL-CCNC: 88 U/L (ref 12–45)
BASOPHILS # BLD AUTO: 0.4 % (ref 0–1.8)
BASOPHILS # BLD: 0.03 K/UL (ref 0–0.12)
BILIRUB SERPL-MCNC: 9.9 MG/DL (ref 0.1–1.5)
BUN SERPL-MCNC: 13 MG/DL (ref 8–22)
CALCIUM SERPL-MCNC: 7.9 MG/DL (ref 8.5–10.5)
CANCER AG19-9 SERPL-ACNC: ABNORMAL U/ML (ref 0–35)
CHLORIDE SERPL-SCNC: 93 MMOL/L (ref 96–112)
CO2 SERPL-SCNC: 26 MMOL/L (ref 20–33)
CREAT SERPL-MCNC: 0.59 MG/DL (ref 0.5–1.4)
EOSINOPHIL # BLD AUTO: 0.18 K/UL (ref 0–0.51)
EOSINOPHIL NFR BLD: 2.4 % (ref 0–6.9)
ERYTHROCYTE [DISTWIDTH] IN BLOOD BY AUTOMATED COUNT: 50.5 FL (ref 35.9–50)
GFR SERPL CREATININE-BSD FRML MDRD: >60 ML/MIN/1.73 M 2
GLOBULIN SER CALC-MCNC: 2.9 G/DL (ref 1.9–3.5)
GLUCOSE BLD-MCNC: 157 MG/DL (ref 65–99)
GLUCOSE BLD-MCNC: 320 MG/DL (ref 65–99)
GLUCOSE SERPL-MCNC: 235 MG/DL (ref 65–99)
HCT VFR BLD AUTO: 35.8 % (ref 42–52)
HGB BLD-MCNC: 11.8 G/DL (ref 14–18)
IMM GRANULOCYTES # BLD AUTO: 0.04 K/UL (ref 0–0.11)
IMM GRANULOCYTES NFR BLD AUTO: 0.5 % (ref 0–0.9)
LYMPHOCYTES # BLD AUTO: 0.55 K/UL (ref 1–4.8)
LYMPHOCYTES NFR BLD: 7.4 % (ref 22–41)
MAGNESIUM SERPL-MCNC: 2 MG/DL (ref 1.5–2.5)
MCH RBC QN AUTO: 29.2 PG (ref 27–33)
MCHC RBC AUTO-ENTMCNC: 33 G/DL (ref 33.7–35.3)
MCV RBC AUTO: 88.6 FL (ref 81.4–97.8)
MONOCYTES # BLD AUTO: 0.74 K/UL (ref 0–0.85)
MONOCYTES NFR BLD AUTO: 9.9 % (ref 0–13.4)
NEUTROPHILS # BLD AUTO: 5.91 K/UL (ref 1.82–7.42)
NEUTROPHILS NFR BLD: 79.4 % (ref 44–72)
NRBC # BLD AUTO: 0 K/UL
NRBC BLD AUTO-RTO: 0 /100 WBC
PLATELET # BLD AUTO: 131 K/UL (ref 164–446)
PMV BLD AUTO: 11.3 FL (ref 9–12.9)
POTASSIUM SERPL-SCNC: 3.4 MMOL/L (ref 3.6–5.5)
PROT SERPL-MCNC: 5.6 G/DL (ref 6–8.2)
RBC # BLD AUTO: 4.04 M/UL (ref 4.7–6.1)
SODIUM SERPL-SCNC: 132 MMOL/L (ref 135–145)
WBC # BLD AUTO: 7.5 K/UL (ref 4.8–10.8)

## 2017-07-03 PROCEDURE — 770009 HCHG ROOM/CARE - ONCOLOGY SEMI PRI*

## 2017-07-03 PROCEDURE — 99213 OFFICE O/P EST LOW 20 MIN: CPT | Performed by: NURSE PRACTITIONER

## 2017-07-03 PROCEDURE — 83735 ASSAY OF MAGNESIUM: CPT

## 2017-07-03 PROCEDURE — 76705 ECHO EXAM OF ABDOMEN: CPT

## 2017-07-03 PROCEDURE — 99223 1ST HOSP IP/OBS HIGH 75: CPT | Performed by: FAMILY MEDICINE

## 2017-07-03 PROCEDURE — 700102 HCHG RX REV CODE 250 W/ 637 OVERRIDE(OP): Performed by: FAMILY MEDICINE

## 2017-07-03 PROCEDURE — 80053 COMPREHEN METABOLIC PANEL: CPT

## 2017-07-03 PROCEDURE — 36591 DRAW BLOOD OFF VENOUS DEVICE: CPT | Performed by: NURSE PRACTITIONER

## 2017-07-03 PROCEDURE — 86301 IMMUNOASSAY TUMOR CA 19-9: CPT

## 2017-07-03 PROCEDURE — A9270 NON-COVERED ITEM OR SERVICE: HCPCS | Performed by: FAMILY MEDICINE

## 2017-07-03 PROCEDURE — 85025 COMPLETE CBC W/AUTO DIFF WBC: CPT

## 2017-07-03 PROCEDURE — 82962 GLUCOSE BLOOD TEST: CPT | Mod: 91

## 2017-07-03 RX ORDER — MORPHINE SULFATE 30 MG/1
30 TABLET, FILM COATED, EXTENDED RELEASE ORAL EVERY 12 HOURS
Status: DISCONTINUED | OUTPATIENT
Start: 2017-07-03 | End: 2017-07-05 | Stop reason: HOSPADM

## 2017-07-03 RX ORDER — AMOXICILLIN 250 MG
2 CAPSULE ORAL 2 TIMES DAILY
Status: DISCONTINUED | OUTPATIENT
Start: 2017-07-03 | End: 2017-07-05 | Stop reason: HOSPADM

## 2017-07-03 RX ORDER — PROMETHAZINE HYDROCHLORIDE 25 MG/1
12.5-25 TABLET ORAL EVERY 4 HOURS PRN
Status: DISCONTINUED | OUTPATIENT
Start: 2017-07-03 | End: 2017-07-05 | Stop reason: HOSPADM

## 2017-07-03 RX ORDER — POTASSIUM CHLORIDE 20 MEQ/1
20 TABLET, EXTENDED RELEASE ORAL DAILY
Status: DISCONTINUED | OUTPATIENT
Start: 2017-07-03 | End: 2017-07-03

## 2017-07-03 RX ORDER — BISACODYL 10 MG
10 SUPPOSITORY, RECTAL RECTAL
Status: DISCONTINUED | OUTPATIENT
Start: 2017-07-03 | End: 2017-07-05 | Stop reason: HOSPADM

## 2017-07-03 RX ORDER — POLYETHYLENE GLYCOL 3350 17 G/17G
1 POWDER, FOR SOLUTION ORAL
Status: DISCONTINUED | OUTPATIENT
Start: 2017-07-03 | End: 2017-07-05 | Stop reason: HOSPADM

## 2017-07-03 RX ORDER — FUROSEMIDE 40 MG/1
40 TABLET ORAL 2 TIMES DAILY
Status: DISCONTINUED | OUTPATIENT
Start: 2017-07-03 | End: 2017-07-05

## 2017-07-03 RX ORDER — PROMETHAZINE HYDROCHLORIDE 25 MG/1
12.5-25 SUPPOSITORY RECTAL EVERY 4 HOURS PRN
Status: DISCONTINUED | OUTPATIENT
Start: 2017-07-03 | End: 2017-07-05 | Stop reason: HOSPADM

## 2017-07-03 RX ORDER — ONDANSETRON 2 MG/ML
4 INJECTION INTRAMUSCULAR; INTRAVENOUS EVERY 4 HOURS PRN
Status: DISCONTINUED | OUTPATIENT
Start: 2017-07-03 | End: 2017-07-05 | Stop reason: HOSPADM

## 2017-07-03 RX ORDER — OXYCODONE HYDROCHLORIDE 10 MG/1
10 TABLET ORAL EVERY 4 HOURS PRN
Status: DISCONTINUED | OUTPATIENT
Start: 2017-07-03 | End: 2017-07-05

## 2017-07-03 RX ORDER — ONDANSETRON 4 MG/1
4 TABLET, ORALLY DISINTEGRATING ORAL EVERY 4 HOURS PRN
Status: DISCONTINUED | OUTPATIENT
Start: 2017-07-03 | End: 2017-07-05 | Stop reason: HOSPADM

## 2017-07-03 RX ORDER — POTASSIUM CHLORIDE 20 MEQ/1
20 TABLET, EXTENDED RELEASE ORAL 2 TIMES DAILY
Status: DISCONTINUED | OUTPATIENT
Start: 2017-07-03 | End: 2017-07-05 | Stop reason: HOSPADM

## 2017-07-03 RX ORDER — INSULIN GLARGINE 100 [IU]/ML
20 INJECTION, SOLUTION SUBCUTANEOUS NIGHTLY
Status: DISCONTINUED | OUTPATIENT
Start: 2017-07-03 | End: 2017-07-05 | Stop reason: HOSPADM

## 2017-07-03 RX ORDER — TRAMADOL HYDROCHLORIDE 50 MG/1
50 TABLET ORAL EVERY 6 HOURS PRN
Status: DISCONTINUED | OUTPATIENT
Start: 2017-07-03 | End: 2017-07-05 | Stop reason: HOSPADM

## 2017-07-03 RX ADMIN — OXYCODONE HYDROCHLORIDE 10 MG: 10 TABLET ORAL at 15:50

## 2017-07-03 RX ADMIN — OXYCODONE HYDROCHLORIDE 10 MG: 10 TABLET ORAL at 21:45

## 2017-07-03 RX ADMIN — INSULIN GLARGINE 20 UNITS: 100 INJECTION, SOLUTION SUBCUTANEOUS at 20:29

## 2017-07-03 RX ADMIN — MORPHINE SULFATE 30 MG: 30 TABLET, EXTENDED RELEASE ORAL at 20:23

## 2017-07-03 RX ADMIN — INSULIN LISPRO 6 UNITS: 100 INJECTION, SOLUTION INTRAVENOUS; SUBCUTANEOUS at 20:29

## 2017-07-03 RX ADMIN — INSULIN LISPRO 2 UNITS: 100 INJECTION, SOLUTION INTRAVENOUS; SUBCUTANEOUS at 17:25

## 2017-07-03 RX ADMIN — STANDARDIZED SENNA CONCENTRATE AND DOCUSATE SODIUM 2 TABLET: 8.6; 5 TABLET, FILM COATED ORAL at 20:23

## 2017-07-03 RX ADMIN — RIVAROXABAN 15 MG: 15 TABLET, FILM COATED ORAL at 20:36

## 2017-07-03 ASSESSMENT — PAIN SCALES - GENERAL
PAINLEVEL_OUTOF10: 6
PAINLEVEL_OUTOF10: 2
PAINLEVEL_OUTOF10: 9
PAINLEVEL_OUTOF10: 2
PAINLEVEL: 9=SEVERE PAIN
PAINLEVEL_OUTOF10: 10
PAINLEVEL: 9=SEVERE PAIN

## 2017-07-03 ASSESSMENT — ENCOUNTER SYMPTOMS
TINGLING: 0
CHILLS: 0
WEIGHT LOSS: 1
VOMITING: 0
FEVER: 0
DIARRHEA: 0
NAUSEA: 1
ABDOMINAL PAIN: 1
CONSTIPATION: 0
PALPITATIONS: 0
BACK PAIN: 1
SPUTUM PRODUCTION: 1
COUGH: 1

## 2017-07-03 ASSESSMENT — LIFESTYLE VARIABLES
ALCOHOL_USE: NO
EVER_SMOKED: YES

## 2017-07-03 NOTE — NON-PROVIDER
Errol Sauceda is a 52 y.o. male here for a non-provider visit for: port access/labs on 7/3/2017 at 8:30 AM    Procedure Performed: Port access w/ lab draw  Port access in sterile fashion; brisk blood return verified. Labs drawn per written chemotherapy treatment plan.  Port flushed per protocol with NS and heparin.  Port remains heplocked/accessed for chemotherapy today.    Anatomical site: Right chest    Equipment used: Port Access Kit    Labs drawn: cbc w/diff, cmp, magnesium, ca19-9    Ordering Provider: ADOLFO Crawford    Branden By: Jona Barrientos R.N.

## 2017-07-03 NOTE — CARE PLAN
Problem: Safety  Goal: Will remain free from injury  Hourly rounding in effect, pt instructed to call for assistance, bed locked and in lowest position. Bed alarm off, with Pamela as second RN. Pt ambulates with a steady gait, calls appropriately, personal possessions within reach.        Problem: Knowledge Deficit  Goal: Knowledge of disease process/condition, treatment plan, diagnostic tests, and medications will improve  Pt updated and educated on nursing interventions, medications and POC

## 2017-07-03 NOTE — MR AVS SNAPSHOT
"Errol Sauceda   7/3/2017 9:00 AM   Office Visit   MRN: [id number]    Department:  [facility]   Dept Phone:  [phone]    Description:  Male : 1965   Provider:  ELIJAH Watkins.P.N.           Reason for Visit     Follow-Up prechemo       Allergies as of 7/3/2017     No Known Allergies      You were diagnosed with     Pancreatic cancer metastasized to liver (CMS-HCC)   [834575]         Vital Signs     Blood Pressure Pulse Temperature Respirations Height Weight    118/78 mmHg 96 36.7 °C (98.1 °F) 14 1.854 m (6' 0.99\") 131.45 kg (289 lb 12.7 oz)    Body Mass Index Oxygen Saturation Smoking Status             38.24 kg/m2 95% Former Smoker         Basic Information     Date Of Birth Sex Race Ethnicity Preferred Language    1965 Male White Non- English      Your appointments     2017 11:00 AM   Est Chemo 3 with RN 2   [facility] ([facility])    [address] [facility] [address]  [place] [address] [phone]            2017  8:30 AM   Non Provider 1 with ONC MA 1   [facility] (--)    [address], [facility] [address]  [facility] [address] [phone]           You will be receiving a confirmation call a few days before your appointment from our automated call confirmation system.            2017 10:00 AM   ONCOLOGY EST PATIENT 30 MIN with Heidy Norton, A.P.N.   [facility] (--)    [address], [address]  [facility] [address] [phone]            2017 11:00 AM   Est Chemo 3 with RN 2   [facility] ([facility])    [address] [facility] [address]  [place] [address] [phone]            2017 11:30 AM   EST Port Flush / Central Line Care with RN 5   [facility] ([facility])    [address] [facility] [address]  [place] [address] [phone]            2017  9:00 AM   Non Provider 1 with ONC RN 2   [facility] (--)    [address], [address]  [facility] [address] [phone]           You will be " receiving a confirmation call a few days before your appointment from our automated call confirmation system.            Jul 31, 2017 10:00 AM   ONCOLOGY EST PATIENT 30 MIN with Marlon Moss M.D.   [facility] (--)    [address] [facility], [address]  [place] [address] [phone]            Jul 31, 2017 11:00 AM   Est Chemo 3 with RN 2   [facility] ([facility])    [address] [facility] [address]  [facility] [place] [facility] [address] [phone]            Aug 02, 2017 11:30 AM   EST Port Flush / Central Line Care with RN 2   [facility] ([facility])    [address] [facility] [address]  [facility] [place] [address] [phone]              Problem List              ICD-10-CM Priority Class Noted - Resolved    Pancreatic cancer metastasized to liver (CMS-HCC) C25.9, C78.7   6/12/2017 - Present    Obstructive jaundice K83.8   6/24/2017 - Present    Diabetes (CMS-HCC) E11.9   6/24/2017 - Present    Encephalopathy G93.40   6/24/2017 - Present    Leukocytosis D72.829   6/24/2017 - Present    Diarrhea R19.7   6/24/2017 - Present    Thrombocytopenia (CMS-HCC) D69.6   6/24/2017 - Present    Transaminitis R74.0   6/24/2017 - Present    Elevated alkaline phosphatase level R74.8   6/24/2017 - Present    Pulmonary emboli (CMS-HCC) I26.99   6/24/2017 - Present    Thrush B37.0   6/25/2017 - Present      Health Maintenance        Date Due Completion Dates    IMM HEP B VACCINE (1 of 3 - Primary Series) 1965 ---    A1C SCREENING 1965 ---    DIABETES MONOFILAMENT / LE EXAM 1965 ---    RETINAL SCREENING 5/10/1983 ---    FASTING LIPID PROFILE 5/10/1983 ---    URINE ACR / MICROALBUMIN 5/10/1983 ---    IMM DTaP/Tdap/Td Vaccine (1 - Tdap) 5/10/1984 ---    IMM PNEUMOCOCCAL 19-64 (ADULT) MEDIUM RISK SERIES (1 of 1 - PPSV23) 5/10/1984 ---    COLONOSCOPY 5/10/2015 ---    IMM INFLUENZA (1) 9/1/2017 ---    SERUM CREATININE 6/26/2018 6/26/2017, 6/25/2017, 6/24/2017, 6/23/2017, 6/19/2017            Current Immunizations     No immunizations on  file.      Below and/or attached are the medications your provider expects you to take. Review all of your home medications and newly ordered medications with your provider and/or pharmacist. Follow medication instructions as directed by your provider and/or pharmacist. Please keep your medication list with you and share with your provider. Update the information when medications are discontinued, doses are changed, or new medications (including over-the-counter products) are added; and carry medication information at all times in the event of emergency situations     Allergies:  No Known Allergies          Medications  Valid as of: July 03, 2017 - 10:02 AM    Generic Name Brand Name Tablet Size Instructions for use    Empagliflozin (Tab) JARDIANCE 25 MG Take 25 mg by mouth every day.        Furosemide (Tab) LASIX 40 MG Take 40 mg by mouth 2 Times a Day.        Glimepiride (Tab) AMARYL 4 MG Take 4 mg by mouth 2 times a day.        Insulin Glargine (Solution) LANTUS 100 UNIT/ML Inject 20 Units as instructed every evening.        Lidocaine-Prilocaine (Cream) EMLA 2.5-2.5 % Apply to port one hour prior to access and cover with plastic wrap.        Loperamide HCl (Tab) IMODIUM A-D 2 MG Take 1 Tab by mouth See Admin Instructions.        MetFORMIN HCl (Tab) GLUCOPHAGE 500 MG Take 500 mg by mouth 2 times a day, with meals.        Morphine Sulfate (Tab CR) MS CONTIN 30 MG Take 1 Tab by mouth every 12 hours.        Multiple Vitamins-Minerals (Tab) THERAGRAN-M  Take 1 Tab by mouth every day.        Niacin (Tab) niacin 500 MG Take 500 mg by mouth 2 times a day.        Nystatin (Suspension) MYCOSTATIN 551992 UNIT/ML Take 5 mL by mouth 4 times a day for 14 days.        Ondansetron HCl (Tab) ZOFRAN 4 MG Take 1 Tab by mouth every four hours as needed for Nausea/Vomiting (for nausea, vomiting).        OxyCODONE HCl (Tab) ROXICODONE 10 MG Take 1 Tab by mouth every four hours as needed for Moderate Pain.        Potassium Chloride  Krista CR (Tab CR) Kdur 20 MEQ Take 20 mEq by mouth every day.        Prochlorperazine Maleate (Tab) COMPAZINE 10 MG Take 1 Tab by mouth every 6 hours as needed (for nausea, vomiting).        Rivaroxaban (Tab) XARELTO 15 MG Take 15 mg by mouth 2 Times a Day.        TraMADol HCl (Tab) ULTRAM 50 MG Take  mg by mouth every 6 hours as needed.        .                 Medicines prescribed today were sent to:     [facility] [address] [place] [address], [place] - [address] [facility]    [address] [facility] [address]    Phone: [phone] Fax: [phone]    Open 24 Hours?: No    [facility] [address] [place], [place] - [address] AT [facility] [address]    [address] [facility] [address]    Phone: [phone] Fax: [phone]    Open 24 Hours?: No      Medication refill instructions:       If your prescription bottle indicates you have medication refills left, it is not necessary to call your provider’s office. Please contact your pharmacy and they will refill your medication.    If your prescription bottle indicates you do not have any refills left, you may request refills at any time through one of the following ways: The online ViVi[employer] system (except Urgent Care), by calling your provider’s office, or by asking your pharmacy to contact your provider’s office with a refill request. Medication refills are processed only during regular business hours and may not be available until the next business day. Your provider may request additional information or to have a follow-up visit with you prior to refilling your medication.   *Please Note: Medication refills are assigned a new Rx number when refilled electronically. Your pharmacy may indicate that no refills were authorized even though a new prescription for the same medication is available at the pharmacy. Please request the medicine by name with the pharmacy before contacting your provider for a refill.           ViVi[employer] Access Code: Activation  code not generated  Current MyChart Status: Active

## 2017-07-03 NOTE — IP AVS SNAPSHOT
" Home Care Instructions                                                                                                                  Name:Errol Sauceda  Medical Record Number:[id number]  CSN: [id number]    YOB: 1965   Age: 52 y.o.  Sex: male  HT:1.854 m (6' 1\") WT: 130.9 kg (288 lb 9.3 oz)          Admit Date: 7/3/2017     Discharge Date:   Today's Date: 7/5/2017  Attending Doctor:  Alis Christian D.O.                  Allergies:  Review of patient's allergies indicates no known allergies.            Discharge Instructions       Discharge Instructions    Discharged to home by car with relative. Discharged via wheelchair, hospital escort: Yes.  Special equipment needed: Not Applicable    Be sure to schedule a follow-up appointment with your primary care doctor or any specialists as instructed.     Discharge Plan:   Influenza Vaccine Indication: Patient Refuses    I understand that a diet low in cholesterol, fat, and sodium is recommended for good health. Unless I have been given specific instructions below for another diet, I accept this instruction as my diet prescription.   Other diet: Diabetic    Special Instructions: None    · Is patient discharged on Warfarin / Coumadin?   No     · Is patient Post Blood Transfusion?  No    Depression / Suicide Risk    As [facility] are [facility], it is important to learn how to keep safe from harming yourself.    Recognize the warning signs:  · Abrupt changes in personality, positive or negative- including increase in energy   · Giving away possessions  · Change in eating patterns- significant weight changes-  positive or negative  · Change in sleeping patterns- unable to sleep or sleeping all the time   · Unwillingness or inability to communicate  · Depression  · Unusual sadness, discouragement and loneliness  · Talk of wanting to die  · Neglect of personal appearance   · Rebelliousness- reckless behavior  · Withdrawal from " people/activities they love  · Confusion- inability to concentrate     If you or a loved one observes any of these behaviors or has concerns about self-harm, here's what you can do:  · Talk about it- your feelings and reasons for harming yourself  · Remove any means that you might use to hurt yourself (examples: pills, rope, extension cords, firearm)  · Get professional help from the community (Mental Health, Substance Abuse, psychological counseling)  · Do not be alone:Call your Safe Contact- someone whom you trust who will be there for you.  · Call your local CRISIS HOTLINE 586-1088 or 086-666-6183  · Call your local Children's Mobile Crisis Response Team [place] (410) 164-5541 or www.[employer]  · Call the toll free National Suicide Prevention Hotlines   · National Suicide Prevention Lifeline 873-018-EUPJ (2380)  · National Hope Line Network 800-SUICIDE (795-8141)        Your appointments     Jul 12, 2017 12:40 PM   ONCOLOGY EST PATIENT 30 MIN with Marlon Moss M.D.   [facility] (--)    [address], [facility] [address]  [facility] [address] [phone]            Jul 12, 2017  1:30 PM   Est Chemo 3 with RN 3   [facility]fusion [facility] ([facility])    [address] [address] [phone]            Jul 17, 2017 10:00 AM   ONCOLOGY EST PATIENT 30 MIN with ANGÉLICA Watkins   [facility] (--)    [address], [facility] [address]  [facility] [address]   [phone]            Jul 19, 2017 11:00 AM   Est Chemo 1 with RN 5   [facility] ([facility])    [address]  [facility] [address]   [phone]            Jul 26, 2017 11:00 AM   Est Chemo 1 with RN 5   [facility] ([facility])    [address]  [facility] [address]   [phone]            Jul 31, 2017  9:00 AM   Non Provider 1 with ONC RN 2   [facility] (--)    [address], [address]  [facility] [address] [phone]           You will be receiving a confirmation call a few  days before your appointment from our automated call confirmation system.            Jul 31, 2017 10:00 AM   ONCOLOGY EST PATIENT 30 MIN with Marlon Moss M.D.   [facility] (--)    [address] [facility]  [facility] [address]   [phone]            Aug 02, 2017 11:30 AM   Est Chemo 1 with RN 2   Infusion Services ([facility])    [address] [facility] [address]  [place] [address]   [phone]            Aug 09, 2017 11:30 AM   Est Chemo 1 with RN 6   [facility] ([facility])    [address]  [place] [address]   [phone]            Aug 16, 2017  1:30 PM   Est Chemo 1 with RN 2   [facility] ([facility])    [address]  [place] [address]   [phone]            Aug 23, 2017 11:30 AM   Est Chemo 1 with RN 6   [facility] ([facility])    [address] [facility] [address]  [place] [address]   [phone]            Aug 30, 2017 11:30 AM   Est Chemo 1 with RN 6   Infusion Services ([facility])    [address]  [place] [address]   [phone]              Follow-up Information     1. Follow up with Marlon Moss M.D. In 1 week.    Specialty:  Oncology    Contact information    [address] [facility]  [address]  [facility] [address]  [phone]           Discharge Medication Instructions:    Below are the medications your physician expects you to take upon discharge:    Review all your home medications and newly ordered medications with your doctor and/or pharmacist. Follow medication instructions as directed by your doctor and/or pharmacist.    Please keep your medication list with you and share with your physician.               Medication List      CONTINUE taking these medications        Instructions    Morning Afternoon Evening Bedtime    furosemide 40 MG Tabs   Last time this was given:  40 mg on 7/4/2017  9:03 PM   Commonly known as:  LASIX   Next Dose Due:  Tomorrow AM        Take 40 mg by mouth 2 Times a Day.   Dose:  40 mg                        glimepiride 4 MG  Tabs   Commonly known as:  AMARYL        Take 4 mg by mouth 2 times a day.   Dose:  4 mg                        insulin glargine 100 UNIT/ML Soln   Last time this was given:  20 Units on 7/4/2017  8:44 PM   Commonly known as:  LANTUS   Next Dose Due:  Tonight around 9pm        Inject 25 Units as instructed every evening.   Dose:  25 Units                        JARDIANCE 25 MG Tabs   Generic drug:  Empagliflozin   Next Dose Due:  Resume schedule when you get home        Take 25 mg by mouth every day.   Dose:  25 mg                        metformin 1000 MG tablet   Commonly known as:  GLUCOPHAGE   Next Dose Due:  Resume normal schedule when you return home        Take 1,000 mg by mouth 2 times a day.   Dose:  1000 mg                        morphine ER 30 MG Tbcr tablet   Start taking on:  7/13/2017   Last time this was given:  30 mg on 7/5/2017  8:05 AM   Commonly known as:  MS CONTIN   Next Dose Due:  Tonight around 8 PM        Take 1 Tab by mouth every 12 hours.   Dose:  30 mg                        niacin 500 MG Tabs   Next Dose Due:  Resume normal schedule at home        Take 500 mg by mouth 2 times a day.   Dose:  500 mg                        nystatin 596426 UNIT/ML Susp   Commonly known as:  MYCOSTATIN   Next Dose Due:  Resume normal schedule at home        Take 5 mL by mouth 4 times a day for 14 days.   Dose:  5 mL                        oxycodone immediate release 10 MG immediate release tablet   Last time this was given:  10 mg on 7/5/2017 10:15 AM   Commonly known as:  ROXICODONE   Next Dose Due:  6:05 Pm        Take 1 Tab by mouth every four hours as needed for Moderate Pain.   Dose:  10 mg                        potassium chloride SA 20 MEQ Tbcr   Last time this was given:  20 mEq on 7/5/2017  8:05 AM   Commonly known as:  Kdur   Next Dose Due:  Tomorrow AM        Take 20 mEq by mouth every day.   Dose:  20 mEq                        therapeutic multivitamin-minerals Tabs   Next Dose Due:  Resume normal  schedule at home        Take 1 Tab by mouth every day.   Dose:  1 Tab                        XARELTO 15 MG Tabs tablet   Last time this was given:  15 mg on 7/4/2017  8:06 AM   Generic drug:  rivaroxaban   Next Dose Due:  Resume normal schedule at home        Take 15 mg by mouth 2 Times a Day.   Dose:  15 mg                             Where to Get Your Medications      Information about where to get these medications is not yet available     ! Ask your nurse or doctor about these medications    - morphine ER 30 MG Tbcr tablet  - oxycodone immediate release 10 MG immediate release tablet            Orders for after discharge     NURSING COMMUNICATION #1    Complete by:  As directed    Please release CBC/DIFF and CMP, if not done in advance of appointment.       NURSING COMMUNICATION #2    Complete by:  As directed    (TREATMENT PARAMETERS:  Instructions: Verify lab results prior to initiation of chemotherapy.   HOLD/CALL IF:  (1) ANC is less than 1000/uL  (2) Platelets are less than 100,000/uL  (3) Hgb is less than 8 g/dL  (4) T.bili is greater than 1.6 mg/dL  Contact provider if conditions evaluated in ordered labs are not met.             Instructions           Diet / Nutrition:    Follow any diet instructions given to you by your doctor or the dietician, including how much salt (sodium) you are allowed each day.    If you are overweight, talk to your doctor about a weight reduction plan.    Activity:    Remain physically active following your doctor's instructions about exercise and activity.    Rest often.     Any time you become even a little tired or short of breath, SIT DOWN and rest.    Worsening Symptoms:    Report any of the following signs and symptoms to the doctor's office immediately:    *Pain of jaw, arm, or neck  *Chest pain not relieved by medication                               *Dizziness or loss of consciousness  *Difficulty breathing even when at rest   *More tired than usual                                        *Bleeding drainage or swelling of surgical site  *Swelling of feet, ankles, legs or stomach                 *Fever (>100ºF)  *Pink or blood tinged sputum  *Weight gain (3lbs/day or 5lbs /week)           *Shock from internal defibrillator (if applicable)  *Palpitations or irregular heartbeats                *Cool and/or numb extremities    Stroke Awareness    Common Risk Factors for Stroke include:    Age  Atrial Fibrillation  Carotid Artery Stenosis  Diabetes Mellitus  Excessive alcohol consumption  High blood pressure  Overweight   Physical inactivity  Smoking    Warning signs and symptoms of a stroke include:    *Sudden numbness or weakness of the face, arm or leg (especially on one side of the body).  *Sudden confusion, trouble speaking or understanding.  *Sudden trouble seeing in one or both eyes.  *Sudden trouble walking, dizziness, loss of balance or coordination.Sudden severe headache with no known cause.    It is very important to get treatment quickly when a stroke occurs. If you experience any of the above warning signs, call 911 immediately.                   Disclaimer         Quit Smoking / Tobacco Use:    I understand the use of any tobacco products increases my chance of suffering from future heart disease or stroke and could cause other illnesses which may shorten my life. Quitting the use of tobacco products is the single most important thing I can do to improve my health. For further information on smoking / tobacco cessation call a Toll Free Quit Line at 1-668.449.1146 (*National Cancer [place]) or 1-675.160.9956 (American Lung Association) or you can access the web based program at www.lungusa.org.    [place] Tobacco Users Help Line:  (125) 676-3523       Toll Free: 1-853.815.4605  Quit Tobacco Program [facility] (702)467-8673    Crisis Hotline:    [place] Crisis Hotline:  3-006-FSNEXZM or 1-697.713.6090    [place] Crisis Hotline:    1-391.327.7152 or  433.576.8587    Discharge Survey:   Thank you for choosing [facility]. We hope we did everything we could to make your hospital stay a pleasant one. You may be receiving a phone survey and we would appreciate your time and participation in answering the questions. Your input is very valuable to us in our efforts to improve our service to our patients and their families.        My signature on this form indicates that:    1. I have reviewed and understand the above information.  2. My questions regarding this information have been answered to my satisfaction.  3. I have formulated a plan with my discharge nurse to obtain my prescribed medications for home.                  Disclaimer         __________________________________                     __________       ________                       Patient Signature                                                 Date                    Time

## 2017-07-03 NOTE — IP AVS SNAPSHOT
" <p align=\"LEFT\"><IMG SRC=\"//[email or web address]$/[email or web address]\" alt=\"Image\" WIDTH=\"50%\" HEIGHT=\"200\" BORDER=\"\"></p>                   Name:Errol Sauceda  Medical Record Number:[id number]  CSN: [id number]    YOB: 1965   Age: 52 y.o.  Sex: male  HT:1.854 m (6' 1\") WT: 130.9 kg (288 lb 9.3 oz)          Admit Date: 7/3/2017     Discharge Date:   Today's Date: 7/5/2017  Attending Doctor:  Alis Christian D.O.                  Allergies:  Review of patient's allergies indicates no known allergies.          Your appointments     Jul 12, 2017 12:40 PM   ONCOLOGY EST PATIENT 30 MIN with Marlon Moss M.D.   [facility] (--)    [address], [facility] [address]  [facility] [address]   [phone]            Jul 12, 2017  1:30 PM   Est Chemo 3 with RN 3   Infusion Services ([facility])    [address]  [facility] [address]   [phone]            Jul 17, 2017 10:00 AM   ONCOLOGY EST PATIENT 30 MIN with ANGÉLICA Watkins   [facility] (--)    [address], [facility] [address]  [facility] [address]   [phone]            Jul 19, 2017 11:00 AM   Est Chemo 1 with RN 5   Infusion Services ([facility])    [address] [facility] [address]  [facility] [address]   [phone]            Jul 26, 2017 11:00 AM   Est Chemo 1 with RN 5   Infusion Services ([facility])    [address] [facility] [address]  [facility] [address]   [phone]            Jul 31, 2017  9:00 AM   Non Provider 1 with ONC RN 2   [facility] (--)    [address], [address]  [facility] [address]   [phone]           You will be receiving a confirmation call a few days before your appointment from our automated call confirmation system.            Jul 31, 2017 10:00 AM   ONCOLOGY EST PATIENT 30 MIN with Marlon Moss M.D.   [facility] (--)    [address]  [place] [address]   [phone]            Aug 02, 2017 11:30 AM   Est Chemo 1 with RN 2   Infusion Services ([facility])    [address] [facility] [address]  [place] [address]   [phone]            Aug 09, 2017 11:30 AM   Est Chemo 1 with RN 6   Infusion Services ([facility])    [address]  [facility] [address]   [phone]            Aug 16, 2017  1:30 PM   Est Chemo 1 with RN 2   Infusion Services ([facility])    [address] [address]  [place] [address]   [phone]            Aug 23, 2017 11:30 AM   Est Chemo 1 with RN 6   Infusion Services ([facility])    [address] [address]  [place] [address]   [phone]            Aug 30, 2017 11:30 AM   Est Chemo 1 with RN 6   Infusion [facility] ([facility])    [address] [place] [address]   [phone]              Follow-up Information     1. Follow up with Marlon Moss M.D. In 1 week.    Specialty:  Oncology    Contact information    [address]   [address]  [facility] [address]  [phone]           Medication List      Take these Medications        Instructions    furosemide 40 MG Tabs   Commonly known as:  LASIX    Take 40 mg by mouth 2 Times a Day.   Dose:  40 mg       glimepiride 4 MG Tabs   Commonly known as:  AMARYL    Take 4 mg by mouth 2 times a day.   Dose:  4 mg       insulin glargine 100 UNIT/ML Soln   Commonly known as:  LANTUS    Inject 25 Units as instructed every evening.   Dose:  25 Units       JARDIANCE 25 MG Tabs   Generic drug:  Empagliflozin    Take 25 mg by mouth every day.   Dose:  25 mg       metformin 1000 MG tablet   Commonly known as:  GLUCOPHAGE    Take 1,000 mg by mouth 2 times a day.   Dose:  1000 mg       morphine ER 30 MG Tbcr tablet   Start taking on:  7/13/2017   Commonly known as:  MS CONTIN    Take 1 Tab by mouth every 12 hours.   Dose:  30 mg       niacin 500 MG Tabs    Take 500 mg by mouth 2 times a day.   Dose:  500 mg       nystatin 038484 UNIT/ML Susp   Commonly known as:  MYCOSTATIN    Take 5 mL by mouth 4 times a day for 14 days.   Dose:  5 mL       oxycodone immediate release 10 MG immediate release tablet   Commonly known as:  ROXICODONE    Take 1  Tab by mouth every four hours as needed for Moderate Pain.   Dose:  10 mg       potassium chloride SA 20 MEQ Tbcr   Commonly known as:  Kdur    Take 20 mEq by mouth every day.   Dose:  20 mEq       therapeutic multivitamin-minerals Tabs    Take 1 Tab by mouth every day.   Dose:  1 Tab       XARELTO 15 MG Tabs tablet   Generic drug:  rivaroxaban    Take 15 mg by mouth 2 Times a Day.   Dose:  15 mg

## 2017-07-03 NOTE — MR AVS SNAPSHOT
Errol Sauceda   7/3/2017 8:30 AM   Appointment   MRN: [id number]    Department:  [facility]   Dept Phone:  [phone]    Description:  Male : 1965   Provider:  ONC RN 2           Allergies as of 7/3/2017     No Known Allergies      Vital Signs     Smoking Status                   Former Smoker           Basic Information     Date Of Birth Sex Race Ethnicity Preferred Language    1965 Male White Non- English      Your appointments     2017  9:00 AM   ONCOLOGY EST PATIENT 30 MIN with Heidy Norton A.P.NJaci   [facility] (--)    [address], [facility] [address]  [facility] [address] [phone]            2017 11:00 AM   Est Chemo 3 with RN 2   Infusion Services ([facility])    [address] [facility] [address]  [facility] [address] [phone]            2017  5:00 PM   EST Port Flush / Central Line Care with INFUSION QUICK INJECT   Infusion Services ([facility])    [address] [facility] [address]  [facility] [address] [phone]            2017  8:30 AM   Non Provider 1 with ONC MA 1   [facility] (--)    [address], [facility] [address]  [facility] [address] [phone]           You will be receiving a confirmation call a few days before your appointment from our automated call confirmation system.            2017 10:00 AM   ONCOLOGY EST PATIENT 30 MIN with Heidy Norton A.P.N.   [facility] (--)    [address], [facility] [address]  [facility] [address] [phone]            2017 11:00 AM   Est Chemo 3 with RN 2   Infusion Services ([facility])    [address] [facility] [address]  [place] [address] [phone]            2017 11:30 AM   EST Port Flush / Central Line Care with RN 5   Infusion Services ([facility])    [address] [facility] [address]  [place] [address] [phone]            2017  9:00 AM   Non Provider 1 with ONC RN 2   [facility] (--)    [address]  [facility] [address]      [phone]           You will be receiving a confirmation call a few days before your appointment from our automated call confirmation system.            Jul 31, 2017 10:00 AM   ONCOLOGY EST PATIENT 30 MIN with Marlon Moss M.D.   [facility] (--)    [address]  [facility] [address] [phone]            Jul 31, 2017 11:00 AM   Est Chemo 3 with RN 2   Infusion Services ([facility])    [address] [facility] [address]  [facility] [address] [phone]            Aug 02, 2017 11:30 AM   EST Port Flush / Central Line Care with RN 2   Infusion Services ([facility])    [address] [facility] [address]  [facility] [address] [phone]              Problem List              ICD-10-CM Priority Class Noted - Resolved    Pancreatic cancer metastasized to liver (CMS-HCC) C25.9, C78.7   6/12/2017 - Present    Obstructive jaundice K83.8   6/24/2017 - Present    Diabetes (CMS-HCC) E11.9   6/24/2017 - Present    Encephalopathy G93.40   6/24/2017 - Present    Leukocytosis D72.829   6/24/2017 - Present    Diarrhea R19.7   6/24/2017 - Present    Thrombocytopenia (CMS-HCC) D69.6   6/24/2017 - Present    Transaminitis R74.0   6/24/2017 - Present    Elevated alkaline phosphatase level R74.8   6/24/2017 - Present    Pulmonary emboli (CMS-HCC) I26.99   6/24/2017 - Present    Thrush B37.0   6/25/2017 - Present      Health Maintenance        Date Due Completion Dates    IMM HEP B VACCINE (1 of 3 - Primary Series) 1965 ---    A1C SCREENING 1965 ---    DIABETES MONOFILAMENT / LE EXAM 1965 ---    RETINAL SCREENING 5/10/1983 ---    FASTING LIPID PROFILE 5/10/1983 ---    URINE ACR / MICROALBUMIN 5/10/1983 ---    IMM DTaP/Tdap/Td Vaccine (1 - Tdap) 5/10/1984 ---    IMM PNEUMOCOCCAL 19-64 (ADULT) MEDIUM RISK SERIES (1 of 1 - PPSV23) 5/10/1984 ---    COLONOSCOPY 5/10/2015 ---    IMM INFLUENZA (1) 9/1/2017 ---    SERUM CREATININE 6/26/2018 6/26/2017, 6/25/2017, 6/24/2017, 6/23/2017, 6/19/2017            Current  Immunizations     No immunizations on file.      Below and/or attached are the medications your provider expects you to take. Review all of your home medications and newly ordered medications with your provider and/or pharmacist. Follow medication instructions as directed by your provider and/or pharmacist. Please keep your medication list with you and share with your provider. Update the information when medications are discontinued, doses are changed, or new medications (including over-the-counter products) are added; and carry medication information at all times in the event of emergency situations     Allergies:  No Known Allergies          Medications  Valid as of: July 03, 2017 -  8:58 AM    Generic Name Brand Name Tablet Size Instructions for use    Empagliflozin (Tab) JARDIANCE 25 MG Take 25 mg by mouth every day.        Furosemide (Tab) LASIX 40 MG Take 40 mg by mouth 2 Times a Day.        Glimepiride (Tab) AMARYL 4 MG Take 4 mg by mouth 2 times a day.        Insulin Glargine (Solution) LANTUS 100 UNIT/ML Inject 20 Units as instructed every evening.        Lidocaine-Prilocaine (Cream) EMLA 2.5-2.5 % Apply to port one hour prior to access and cover with plastic wrap.        Loperamide HCl (Tab) IMODIUM A-D 2 MG Take 1 Tab by mouth See Admin Instructions.        MetFORMIN HCl (Tab) GLUCOPHAGE 500 MG Take 500 mg by mouth 2 times a day, with meals.        Morphine Sulfate (Tab CR) MS CONTIN 30 MG Take 1 Tab by mouth every 12 hours.        Multiple Vitamins-Minerals (Tab) THERAGRAN-M  Take 1 Tab by mouth every day.        Niacin (Tab) niacin 500 MG Take 500 mg by mouth 2 times a day.        Nystatin (Suspension) MYCOSTATIN 559350 UNIT/ML Take 5 mL by mouth 4 times a day for 14 days.        Ondansetron HCl (Tab) ZOFRAN 4 MG Take 1 Tab by mouth every four hours as needed for Nausea/Vomiting (for nausea, vomiting).        OxyCODONE HCl (Tab) ROXICODONE 10 MG Take 1 Tab by mouth every four hours as needed for  Moderate Pain.        Potassium Chloride Krista CR (Tab CR) Kdur 20 MEQ Take 20 mEq by mouth every day.        Prochlorperazine Maleate (Tab) COMPAZINE 10 MG Take 1 Tab by mouth every 6 hours as needed (for nausea, vomiting).        Rivaroxaban (Tab) XARELTO 15 MG Take 15 mg by mouth 2 Times a Day.        TraMADol HCl (Tab) ULTRAM 50 MG Take  mg by mouth every 6 hours as needed.        .                 Medicines prescribed today were sent to:     [facility] [address] [place] [address] [address] [facility]    [address] [facility] [address]    Phone: [phone] Fax: [phone]    Open 24 Hours?: No    [email or web address] [address] [place], [place] - [address] [facility]  [facility]    [address] [facility] [address]    Phone: [phone] Fax: [phone]    Open 24 Hours?: No      Medication refill instructions:       If your prescription bottle indicates you have medication refills left, it is not necessary to call your provider’s office. Please contact your pharmacy and they will refill your medication.    If your prescription bottle indicates you do not have any refills left, you may request refills at any time through one of the following ways: The online [employer] system (except Urgent Care), by calling your provider’s office, or by asking your pharmacy to contact your provider’s office with a refill request. Medication refills are processed only during regular business hours and may not be available until the next business day. Your provider may request additional information or to have a follow-up visit with you prior to refilling your medication.   *Please Note: Medication refills are assigned a new Rx number when refilled electronically. Your pharmacy may indicate that no refills were authorized even though a new prescription for the same medication is available at the pharmacy. Please request the medicine by name with the pharmacy before contacting your provider for a  refill.           MyChart Access Code: Activation code not generated  Current [facility] Status: Active

## 2017-07-03 NOTE — PROGRESS NOTES
[facility] Dr Espinal is admitting the patient. Please call the on call admit hospitalist for orders. Please release the ADT order when patient arrives to the unit.

## 2017-07-03 NOTE — H&P
"DOS: 7/3/2017    PATIENT ID:  NAME:  Errol Sauceda  MRN:               [id number]  YOB: 1965    PMD: Pcp Pt States None    CC: jaundice    HPI: Errol Sauceda is a 52 y.o. male with chief complaint of jaundice. Pt with known history of metastatic pancreatic  Cancer to the liver with CT done a week ago which showed progression of metastasis. He had scheduled follow up  today with Oncology for the start of his second cycle of chemotherapy when labs done showed bilirubin had come up   to 9 from 4. His abdominal pain is at abseline, denies fever, chills, nausea or vomiting. He was sent over directly for  further evaluation and management                PAST MEDICAL HISTORY  Past Medical History   Diagnosis Date   • Diabetes (CMS-HCC)         Metastatic pancreatic cancer to liver       History of DVT       History of PE       PAST SURGICAL HISTORY  No past surgical history on file.    FAMILY HISTORY  No family history on file.    SOCIAL HISTORY  Social History   Substance Use Topics   • Smoking status: Former Smoker     Types: Cigarettes     Quit date: 06/14/2005   • Smokeless tobacco: Never Used   • Alcohol Use: Not on file       ALLERGIES  Allergies as of 07/03/2017   • (No Known Allergies)       OUTPATIENT MEDICATIONS  Medication list reviewed    REVIEW OF SYSTEMS  A full 10 point ROS was completed and all pertinent positives and negatives are included in the HPI above by AMA/CMS criteria.    PHYSICAL EXAM:  Blood pressure 113/64, pulse 88, temperature 36.3 °C (97.3 °F), resp. rate 18, height 1.854 m (6' 1\"), weight 130.9 kg (288 lb 9.3 oz), SpO2 96 %.  Oxygen: Pulse Oximetry: 96 %, O2 Delivery: None (Room Air)    Gen: NAD, comfortable  HEENT: NCAT, EOMI, icteric sclerae, no LAD, no JVD, neck supple  Heart: +S1/S2, RRR, no murmur appreciated  Lungs: CTAB, no accessory muscle use  GI: mild tenderness at epigastric, soft, +BS, no rebound/guarding  Extremities: Warm, well-perfused, no " cyanosis/clubbing,+++l edema  MSK: 5/5 strength in all 4 extremities, sensation intact to light touch  Neuro: AO x 3, CN II-XII grossly intact    LAB TESTS:   Recent Labs      07/03/17   0845   WBC  7.5   RBC  4.04*   HEMOGLOBIN  11.8*   HEMATOCRIT  35.8*   MCV  88.6   MCH  29.2   RDW  50.5*   PLATELETCT  131*   MPV  11.3   NEUTSPOLYS  79.40*   LYMPHOCYTES  7.40*   MONOCYTES  9.90   EOSINOPHILS  2.40   BASOPHILS  0.40             Recent Labs      07/03/17   0845   SODIUM  132*   POTASSIUM  3.4*   CHLORIDE  93*   CO2  26   BUN  13   CREATININE  0.59   CALCIUM  7.9*   MAGNESIUM  2.0   ALBUMIN  2.7*     Estimated GFR/CRCL = Estimated Creatinine Clearance: 207.8 mL/min (by C-G formula based on Cr of 0.59).  Recent Labs      07/03/17   0845   GLUCOSE  235*     No results found for: HBA1C, TSH, FREET4, FREET3, CORTISOL  Recent Labs      07/03/17   0845   ASTSGOT  88*   ALTSGPT  67*   TBILIRUBIN  9.9*   ALKPHOSPHAT  965*   GLOBULIN  2.9           Invalid input(s): [id number]  No results found for: [id number], FOLATE, FERRITIN, IRON, TOTIRONBC    IMAGES:  US pending    ASSESSMENT/PLAN:     1. Jaundice. Discussed with GI Dr. Romero, US to evaluated bile duct if with dilation call them back  2. Metastatic Pancreatic Cancer to Liver. Pain control, Oncology to follow  3. DM2, SSI  4. Chronic pain  5. History of DVT. Xarelto  6. History of PE. Xarelto  7. Hypokalemia. Increase KCl  8. PPX: Xarelto  9. DISPO: Pt will require 2 midnight stay    CODE STATUS: DNR

## 2017-07-03 NOTE — PROGRESS NOTES
Subjective:      Errol Sauceda is a 52 y.o. male who presents for Follow-Up for pancreatic cancer.         HPI    Patient seen today in follow-up for pink cancer. He is accompanied by his wife for today's visit. Patient is scheduled to receive cycle #2 of FOLFIRINOX today.    After patient's first cycle of chemotherapy his total bilirubin was approximately a 3.3. Slightly began to trend up and he was admitted to the hospital. Upon admission his bilirubin was 6.6. He was seen by GI and a stent was not conducted. Patient underwent hydration with response at the total bilirubin and he was discharged home with a bilirubin of 4.7. His LFTs did slightly come down with that admission as well. Patient did experience some confusion after his first dose of treatment as well, which did resolve with his bilirubin decreasing as well.    Patient denies any fevers or chills. He has had persistent weight loss. At the start of treatment he has lost approximately 11 pounds. Patient did experience significant mouth sores and was having difficulty time eating and drinking fluid. He was given nystatin and his sores have improved and he is now able to eat much better now.    Patient has persistent fatigue but it is stable. He states he is expressing some dizziness and lightheadedness but has not had any falls. He also mentioned a little bit of blurred vision. Patient stated he has been coughing with a lot of phlegm but it is improving this last few days. He did express some nausea concerns with one episode of vomiting. Patient with persistent lower extremity edema bilaterally in his legs and feet. It's approximately 2-3+ pitting edema, worse on the right than left.    Patient also mentioned that he is had an increased urge to urinate with very minimal output. He states that his urine is very dark brown in color. Patient has been on chronic Lasix. He was previously on 40 mg twice a day. After his hospitalization he decreased his dose  down to 40 mg once a day.    Patient also mentioned a red itchy rash on the tip of his penis and testicles following his chemotherapy. He stated that he realized that he needs to pay better attention to his hygiene. He stated he's been using baby wipes and cleaning his scrotal area much better and the rash is completely gone.    Patient also stated that he has had abdominal pain which is persistent that radiates to his back and above his shoulders. This is likely related to his cancer related pain. He takes pain medication as needed, which states he does get positive results with pain medicine.    Patient's eyes do appear to be slightly jaundiced today. His wife stated she noticed him becoming a little bit more jaundiced as well over the last few days.    No Known Allergies  Current Outpatient Prescriptions on File Prior to Visit   Medication Sig Dispense Refill   • nystatin (MYCOSTATIN) 090428 UNIT/ML Suspension Take 5 mL by mouth 4 times a day for 14 days. 280 mL 0   • niacin 500 MG Tab Take 500 mg by mouth 2 times a day.     • furosemide (LASIX) 40 MG Tab Take 40 mg by mouth 2 Times a Day.     • potassium chloride SA (KDUR) 20 MEQ Tab CR Take 20 mEq by mouth every day.     • glimepiride (AMARYL) 4 MG Tab Take 4 mg by mouth 2 times a day.     • therapeutic multivitamin-minerals (THERAGRAN-M) Tab Take 1 Tab by mouth every day.     • metformin (GLUCOPHAGE) 500 MG Tab Take 500 mg by mouth 2 times a day, with meals.     • insulin glargine (LANTUS) 100 UNIT/ML Solution Inject 20 Units as instructed every evening.     • JARDIANCE 25 MG Tab Take 25 mg by mouth every day.     • XARELTO 15 MG Tab tablet Take 15 mg by mouth 2 Times a Day.     • ondansetron (ZOFRAN) 4 MG Tab tablet Take 1 Tab by mouth every four hours as needed for Nausea/Vomiting (for nausea, vomiting). 30 Tab 6   • prochlorperazine (COMPAZINE) 10 MG Tab Take 1 Tab by mouth every 6 hours as needed (for nausea, vomiting). 30 Tab 6   • loperamide (IMODIUM  "A-D) 2 MG tablet Take 1 Tab by mouth See Admin Instructions. 30 Tab 6   • lidocaine-prilocaine (EMLA) 2.5-2.5 % Cream Apply to port one hour prior to access and cover with plastic wrap. 1 Tube 3   • oxycodone immediate release (ROXICODONE) 10 MG immediate release tablet Take 1 Tab by mouth every four hours as needed for Moderate Pain. 60 Tab 0   • tramadol (ULTRAM) 50 MG Tab Take  mg by mouth every 6 hours as needed.     • morphine ER (MS CONTIN) 30 MG Tab CR tablet Take 1 Tab by mouth every 12 hours. 60 Tab 0     No current facility-administered medications on file prior to visit.       Review of Systems   Constitutional: Positive for weight loss and malaise/fatigue. Negative for fever and chills.   Respiratory: Positive for cough and sputum production.    Cardiovascular: Positive for leg swelling. Negative for chest pain and palpitations.   Gastrointestinal: Positive for nausea and abdominal pain. Negative for vomiting, diarrhea and constipation.   Genitourinary: Positive for urgency. Negative for dysuria.   Musculoskeletal: Positive for back pain.   Skin: Negative for itching and rash.   Neurological: Negative for tingling.          Objective:     /78 mmHg  Pulse 96  Temp(Src) 36.7 °C (98.1 °F)  Resp 14  Ht 1.854 m (6' 0.99\")  Wt 131.45 kg (289 lb 12.7 oz)  BMI 38.24 kg/m2  SpO2 95%     Physical Exam   Constitutional: He is oriented to person, place, and time. He appears well-developed and well-nourished. No distress.   HENT:   Head: Normocephalic and atraumatic.   Mouth/Throat: Oropharynx is clear and moist. No oropharyngeal exudate.   Mouth sores resolved   Eyes: Conjunctivae and EOM are normal. Pupils are equal, round, and reactive to light. Right eye exhibits no discharge. Left eye exhibits no discharge. Scleral icterus is present.   Neck: Normal range of motion. Neck supple. No thyromegaly present.   Cardiovascular: Normal rate, regular rhythm, normal heart sounds and intact distal pulses. "  Exam reveals no gallop and no friction rub.    No murmur heard.  Pulmonary/Chest: Effort normal. No respiratory distress. He has no wheezes.   Decreased on the right upper and lower lobes   Abdominal: Soft. Bowel sounds are normal. He exhibits no distension. There is no tenderness.   Musculoskeletal: He exhibits edema (bilateral lower extremity edema - 2-3+ - right greater than left). He exhibits no tenderness.   Lymphadenopathy:     He has no cervical adenopathy.   Neurological: He is alert and oriented to person, place, and time.   Skin: Skin is warm and dry. No rash noted. He is not diaphoretic. No erythema. No pallor.   Psychiatric: He has a normal mood and affect. His behavior is normal.   Vitals reviewed.           Assessment/Plan:     1. Pancreatic cancer metastasized to liver (CMS-HCC)       Plan  1. Patient did complete his labs today. He is due to start cycle #2 of FOLFIRINOX, however after reviewing his CBC and CMP patient with a total bilirubin of 9.9 today. His alkaline phosphatase is also elevated at 965 today. I discussed with Dr. Alcaraz, my collaborating physician and he is in agreement for patient to be admitted to the hospital for a total bilirubin of 9.9 today.    I spoke with the admitting hospitalist, Dr. Espinal this morning and he has agreed to admit the patient. I did discuss with Dr. Espinal that he was seen by GI at last admission and they did not do a stent placement. Dr. Espinal did mention that he will contact GI again for this admission.     I spoke to the patient and his wife and they are in agreement for the direct admission and understand. We will hold chemotherapy at this time.

## 2017-07-03 NOTE — IP AVS SNAPSHOT
7/5/2017    Errol Londonnathalieaakash  [id number]  [place] [address]    Dear Errol:    [facility] wants to ensure your discharge home is safe and you or your loved ones have had all of your questions answered regarding your care after you leave the hospital.    Below is a list of resources and contact information should you have any questions regarding your hospital stay, follow-up instructions, or active medical symptoms.    Questions or Concerns Regarding… Contact   Medical Questions Related to Your Discharge  (7 days a week, 8am-5pm) Contact a Nurse Care Coordinator   [phone]   Medical Questions Not Related to Your Discharge  (24 hours a day / 7 days a week)  Contact the Nurse Health Line   [phone]    Medications or Discharge Instructions Refer to your discharge packet   or contact your [facility] Primary Care Provider   [phone]   Follow-up Appointment(s) Schedule your appointment via [employer]   or contact Scheduling [phone]   Billing Review your statement via [employer]  or contact Billing [phone]   Medical Records Review your records via [employer]   or contact Medical Records [phone]     You may receive a telephone call within two days of discharge. This call is to make certain you understand your discharge instructions and have the opportunity to have any questions answered. You can also easily access your medical information, test results and upcoming appointments via the [employer] free online health management tool. You can learn more and sign up at [employer]. For assistance setting up your [employer] account, please call [phone].    Once again, we want to ensure your discharge home is safe and that you have a clear understanding of any next steps in your care. If you have any questions or concerns, please do not hesitate to contact us, we are here for you. Thank you for choosing [facility] for your healthcare needs.    Sincerely,    Your [facility] Healthcare Team

## 2017-07-03 NOTE — IP AVS SNAPSHOT
Yohobuy Access Code: Activation code not generated  Current Yohobuy Status: Active    Top[employer]  A secure, online tool to manage your health information     [employer]’s Yohobuy® is a secure, online tool that connects you to your personalized health information from the privacy of your home -- day or night - making it very easy for you to manage your healthcare. Once the activation process is completed, you can even access your medical information using the Yohobuy aj, which is available for free in the Apple Aj store or Google Play store.     Yohobuy provides the following levels of access (as shown below):   My Chart Features   [facility] Primary Care Doctor [facility]  Specialists [facility]  Urgent  Care Non-[facility]  Primary Care  Doctor   Email your healthcare team securely and privately 24/7 X X X X   Manage appointments: schedule your next appointment; view details of past/upcoming appointments X      Request prescription refills. X      View recent personal medical records, including lab and immunizations X X X X   View health record, including health history, allergies, medications X X X X   Read reports about your outpatient visits, procedures, consult and ER notes X X X X   See your discharge summary, which is a recap of your hospital and/or ER visit that includes your diagnosis, lab results, and care plan. X X       How to register for Yohobuy:  1. Go to  [email or web address].  2. Click on the Sign Up Now box, which takes you to the New Member Sign Up page. You will need to provide the following information:  a. Enter your Yohobuy Access Code exactly as it appears at the top of this page. (You will not need to use this code after you’ve completed the sign-up process. If you do not sign up before the expiration date, you must request a new code.)   b. Enter your date of birth.   c. Enter your home email address.   d. Click Submit, and follow the next screen’s instructions.  3. Create a Yohobuy ID. This will  be your [employer] login ID and cannot be changed, so think of one that is secure and easy to remember.  4. Create a [employer] password. You can change your password at any time.  5. Enter your Password Reset Question and Answer. This can be used at a later time if you forget your password.   6. Enter your e-mail address. This allows you to receive e-mail notifications when new information is available in [employer].  7. Click Sign Up. You can now view your health information.    For assistance activating your [employer] account, call [phone]

## 2017-07-04 PROBLEM — E87.6 HYPOKALEMIA: Status: ACTIVE | Noted: 2017-07-04

## 2017-07-04 LAB
ALBUMIN SERPL BCP-MCNC: 2.4 G/DL (ref 3.2–4.9)
ALBUMIN/GLOB SERPL: 0.8 G/DL
ALP SERPL-CCNC: 865 U/L (ref 30–99)
ALT SERPL-CCNC: 61 U/L (ref 2–50)
ANION GAP SERPL CALC-SCNC: 8 MMOL/L (ref 0–11.9)
AST SERPL-CCNC: 68 U/L (ref 12–45)
BASOPHILS # BLD AUTO: 0.6 % (ref 0–1.8)
BASOPHILS # BLD: 0.04 K/UL (ref 0–0.12)
BILIRUB SERPL-MCNC: 9.9 MG/DL (ref 0.1–1.5)
BUN SERPL-MCNC: 12 MG/DL (ref 8–22)
CALCIUM SERPL-MCNC: 8 MG/DL (ref 8.5–10.5)
CHLORIDE SERPL-SCNC: 95 MMOL/L (ref 96–112)
CO2 SERPL-SCNC: 32 MMOL/L (ref 20–33)
CREAT SERPL-MCNC: 0.53 MG/DL (ref 0.5–1.4)
EOSINOPHIL # BLD AUTO: 0.3 K/UL (ref 0–0.51)
EOSINOPHIL NFR BLD: 4.8 % (ref 0–6.9)
ERYTHROCYTE [DISTWIDTH] IN BLOOD BY AUTOMATED COUNT: 50.4 FL (ref 35.9–50)
GFR SERPL CREATININE-BSD FRML MDRD: >60 ML/MIN/1.73 M 2
GLOBULIN SER CALC-MCNC: 2.9 G/DL (ref 1.9–3.5)
GLUCOSE BLD-MCNC: 102 MG/DL (ref 65–99)
GLUCOSE BLD-MCNC: 159 MG/DL (ref 65–99)
GLUCOSE BLD-MCNC: 170 MG/DL (ref 65–99)
GLUCOSE BLD-MCNC: 229 MG/DL (ref 65–99)
GLUCOSE SERPL-MCNC: 130 MG/DL (ref 65–99)
HCT VFR BLD AUTO: 33 % (ref 42–52)
HGB BLD-MCNC: 11 G/DL (ref 14–18)
IMM GRANULOCYTES # BLD AUTO: 0.04 K/UL (ref 0–0.11)
IMM GRANULOCYTES NFR BLD AUTO: 0.6 % (ref 0–0.9)
LYMPHOCYTES # BLD AUTO: 0.84 K/UL (ref 1–4.8)
LYMPHOCYTES NFR BLD: 13.6 % (ref 22–41)
MCH RBC QN AUTO: 29.1 PG (ref 27–33)
MCHC RBC AUTO-ENTMCNC: 33.3 G/DL (ref 33.7–35.3)
MCV RBC AUTO: 87.3 FL (ref 81.4–97.8)
MONOCYTES # BLD AUTO: 0.8 K/UL (ref 0–0.85)
MONOCYTES NFR BLD AUTO: 12.9 % (ref 0–13.4)
NEUTROPHILS # BLD AUTO: 4.17 K/UL (ref 1.82–7.42)
NEUTROPHILS NFR BLD: 67.5 % (ref 44–72)
NRBC # BLD AUTO: 0 K/UL
NRBC BLD AUTO-RTO: 0 /100 WBC
PLATELET # BLD AUTO: 130 K/UL (ref 164–446)
PMV BLD AUTO: 10.9 FL (ref 9–12.9)
POTASSIUM SERPL-SCNC: 3 MMOL/L (ref 3.6–5.5)
PROT SERPL-MCNC: 5.3 G/DL (ref 6–8.2)
RBC # BLD AUTO: 3.78 M/UL (ref 4.7–6.1)
SODIUM SERPL-SCNC: 135 MMOL/L (ref 135–145)
WBC # BLD AUTO: 6.2 K/UL (ref 4.8–10.8)

## 2017-07-04 PROCEDURE — 700101 HCHG RX REV CODE 250: Performed by: INTERNAL MEDICINE

## 2017-07-04 PROCEDURE — 700102 HCHG RX REV CODE 250 W/ 637 OVERRIDE(OP): Performed by: FAMILY MEDICINE

## 2017-07-04 PROCEDURE — 99233 SBSQ HOSP IP/OBS HIGH 50: CPT | Performed by: INTERNAL MEDICINE

## 2017-07-04 PROCEDURE — 80053 COMPREHEN METABOLIC PANEL: CPT

## 2017-07-04 PROCEDURE — A9270 NON-COVERED ITEM OR SERVICE: HCPCS | Performed by: FAMILY MEDICINE

## 2017-07-04 PROCEDURE — 82962 GLUCOSE BLOOD TEST: CPT | Mod: 91

## 2017-07-04 PROCEDURE — 85025 COMPLETE CBC W/AUTO DIFF WBC: CPT

## 2017-07-04 PROCEDURE — 94760 N-INVAS EAR/PLS OXIMETRY 1: CPT

## 2017-07-04 PROCEDURE — 770009 HCHG ROOM/CARE - ONCOLOGY SEMI PRI*

## 2017-07-04 RX ORDER — SODIUM CHLORIDE AND POTASSIUM CHLORIDE 150; 900 MG/100ML; MG/100ML
INJECTION, SOLUTION INTRAVENOUS CONTINUOUS
Status: DISCONTINUED | OUTPATIENT
Start: 2017-07-04 | End: 2017-07-05 | Stop reason: HOSPADM

## 2017-07-04 RX ADMIN — MORPHINE SULFATE 30 MG: 30 TABLET, EXTENDED RELEASE ORAL at 08:07

## 2017-07-04 RX ADMIN — STANDARDIZED SENNA CONCENTRATE AND DOCUSATE SODIUM 2 TABLET: 8.6; 5 TABLET, FILM COATED ORAL at 08:06

## 2017-07-04 RX ADMIN — FUROSEMIDE 40 MG: 40 TABLET ORAL at 08:07

## 2017-07-04 RX ADMIN — RIVAROXABAN 15 MG: 15 TABLET, FILM COATED ORAL at 08:06

## 2017-07-04 RX ADMIN — INSULIN GLARGINE 20 UNITS: 100 INJECTION, SOLUTION SUBCUTANEOUS at 20:44

## 2017-07-04 RX ADMIN — INSULIN LISPRO 2 UNITS: 100 INJECTION, SOLUTION INTRAVENOUS; SUBCUTANEOUS at 17:22

## 2017-07-04 RX ADMIN — MORPHINE SULFATE 30 MG: 30 TABLET, EXTENDED RELEASE ORAL at 20:44

## 2017-07-04 RX ADMIN — POTASSIUM CHLORIDE AND SODIUM CHLORIDE: 900; 150 INJECTION, SOLUTION INTRAVENOUS at 23:59

## 2017-07-04 RX ADMIN — FUROSEMIDE 40 MG: 40 TABLET ORAL at 21:03

## 2017-07-04 RX ADMIN — OXYCODONE HYDROCHLORIDE 10 MG: 10 TABLET ORAL at 09:44

## 2017-07-04 RX ADMIN — STANDARDIZED SENNA CONCENTRATE AND DOCUSATE SODIUM 2 TABLET: 8.6; 5 TABLET, FILM COATED ORAL at 20:44

## 2017-07-04 RX ADMIN — POTASSIUM CHLORIDE 20 MEQ: 1500 TABLET, EXTENDED RELEASE ORAL at 21:00

## 2017-07-04 RX ADMIN — INSULIN LISPRO 3 UNITS: 100 INJECTION, SOLUTION INTRAVENOUS; SUBCUTANEOUS at 20:44

## 2017-07-04 RX ADMIN — INSULIN LISPRO 2 UNITS: 100 INJECTION, SOLUTION INTRAVENOUS; SUBCUTANEOUS at 12:38

## 2017-07-04 RX ADMIN — OXYCODONE HYDROCHLORIDE 10 MG: 10 TABLET ORAL at 13:43

## 2017-07-04 RX ADMIN — POTASSIUM CHLORIDE 20 MEQ: 1500 TABLET, EXTENDED RELEASE ORAL at 08:06

## 2017-07-04 RX ADMIN — POTASSIUM CHLORIDE AND SODIUM CHLORIDE: 900; 150 INJECTION, SOLUTION INTRAVENOUS at 17:22

## 2017-07-04 ASSESSMENT — ENCOUNTER SYMPTOMS
DIARRHEA: 0
HEARTBURN: 0
INSOMNIA: 0
COUGH: 0
DEPRESSION: 0
SPEECH CHANGE: 0
CONSTIPATION: 0
SENSORY CHANGE: 0
VOMITING: 0
NERVOUS/ANXIOUS: 0
DIZZINESS: 0
HEADACHES: 0
MYALGIAS: 0
ABDOMINAL PAIN: 0
SHORTNESS OF BREATH: 0
ROS SKIN COMMENTS: YELLOWING
BLURRED VISION: 0
WEAKNESS: 0
CLAUDICATION: 0
PHOTOPHOBIA: 0
CHILLS: 0
FEVER: 0

## 2017-07-04 ASSESSMENT — PAIN SCALES - GENERAL
PAINLEVEL_OUTOF10: 2
PAINLEVEL_OUTOF10: 8
PAINLEVEL_OUTOF10: 2
PAINLEVEL_OUTOF10: 6
PAINLEVEL_OUTOF10: 9
PAINLEVEL_OUTOF10: 4

## 2017-07-04 NOTE — PROGRESS NOTES
[facility] Hospitalist Progress Note    Date of Service: 2017    Chief Complaint  52 y.o. male admitted 7/3/2017 with increased bilirubin secondary to liver involvement of pancreatic adenocarcinoma.    Interval Problem Update  Patient feeling okay, just fatigued.  He is not confused like prior admission and bilirubin is much higher than when I sent him home last week - up to 9.9 and was 4.7 on discharge.  K is slightly low and patient feeling slightly dehydrated and not able to drink enough.  IVF started with k supplementation.  Oncology to consult to see if any interventions available.  No GI interventions would be of help as still no biliary dilation present on US that was repeated yesterday.      Consultants/Specialty  oncology    Disposition  tbd        Review of Systems   Constitutional: Negative for fever and chills.   HENT: Negative for congestion.    Eyes: Negative for blurred vision and photophobia.   Respiratory: Negative for cough and shortness of breath.    Cardiovascular: Negative for chest pain, claudication and leg swelling.   Gastrointestinal: Negative for heartburn, vomiting, abdominal pain, diarrhea and constipation.   Genitourinary: Negative for dysuria and hematuria.   Musculoskeletal: Negative for myalgias and joint pain.   Skin: Negative for itching and rash.        Yellowing     Neurological: Negative for dizziness, sensory change, speech change, weakness and headaches.   Psychiatric/Behavioral: Negative for depression. The patient is not nervous/anxious and does not have insomnia.       Physical Exam  Laboratory/Imaging   Hemodynamics  Temp (24hrs), Av.5 °C (97.7 °F), Min:36.1 °C (96.9 °F), Max:36.9 °C (98.5 °F)   Temperature: 36.1 °C (96.9 °F)  Pulse  Av.3  Min: 78  Max: 93    Blood Pressure: 103/53 mmHg      Respiratory      Respiration: 18, Pulse Oximetry: 96 %        RUL Breath Sounds: Clear;Diminished, RML Breath Sounds: Diminished, RLL Breath Sounds: Diminished, KITTY Breath  Sounds: Clear;Diminished, LLL Breath Sounds: Diminished    Fluids  No intake or output data in the 24 hours ending 07/04/17 1547    Nutrition  Orders Placed This Encounter   Procedures   • Diet Order     Standing Status: Standing      Number of Occurrences: 1      Standing Expiration Date:      Order Specific Question:  Diet:     Answer:  Diabetic [3]     Physical Exam   Constitutional: He is oriented to person, place, and time. He appears well-developed and well-nourished.   HENT:   Head: Normocephalic and atraumatic.   Eyes: Conjunctivae are normal. Scleral icterus is present.   Neck: Neck supple. No JVD present.   Cardiovascular: Normal rate and regular rhythm.  Exam reveals no gallop and no friction rub.    No murmur heard.  Pulmonary/Chest: Effort normal and breath sounds normal. No respiratory distress. He exhibits no tenderness.   Abdominal: Soft. Bowel sounds are normal. He exhibits no distension and no mass. There is no tenderness.   Musculoskeletal: He exhibits no edema or tenderness.   Neurological: He is alert and oriented to person, place, and time. No cranial nerve deficit.   Skin: Skin is warm and dry. No rash noted. He is not diaphoretic.   Yellow jaundice     Psychiatric: He has a normal mood and affect. His behavior is normal.   Nursing note and vitals reviewed.      Recent Labs      07/03/17   0845  07/04/17   0030   WBC  7.5  6.2   RBC  4.04*  3.78*   HEMOGLOBIN  11.8*  11.0*   HEMATOCRIT  35.8*  33.0*   MCV  88.6  87.3   MCH  29.2  29.1   MCHC  33.0*  33.3*   RDW  50.5*  50.4*   PLATELETCT  131*  130*   MPV  11.3  10.9     Recent Labs      07/03/17   0845  07/04/17   0030   SODIUM  132*  135   POTASSIUM  3.4*  3.0*   CHLORIDE  93*  95*   CO2  26  32   GLUCOSE  235*  130*   BUN  13  12   CREATININE  0.59  0.53   CALCIUM  7.9*  8.0*                      Assessment/Plan     Pancreatic cancer metastasized to liver (CMS-HCC) (present on admission)  Assessment & Plan  Had one treatment of FOLFOX and  bilirubin has remained elevated and even climbed after discharge home  D/w Dr Aly for consultation - likely no longer a candidate for chemo since bilirubin remains high        Obstructive jaundice (present on admission)  Assessment & Plan  No GI intervention would be of help as no diliation on US seen, even on repeat yesterday.  T bili 9.9    Thrombocytopenia (CMS-HCC) (present on admission)  Assessment & Plan  Stable at 130s      Elevated alkaline phosphatase level (present on admission)  Assessment & Plan  Cancer involvement      Pulmonary emboli (CMS-HCC) (present on admission)  Assessment & Plan  Continue xarelto      Medications reviewed, Labs reviewed and Radiology images reviewed  Bennett catheter: No Bennett      DVT: xarelto.

## 2017-07-04 NOTE — ASSESSMENT & PLAN NOTE
Had one treatment of FOLFOX and bilirubin has remained elevated and even climbed after discharge home  D/w Dr Aly for consultation - likely no longer a candidate for chemo since bilirubin remains high

## 2017-07-04 NOTE — DISCHARGE PLANNING
Medical SW    Referral: Pt discussed w/ MD regarding d/c plan.    Intervention: Pt readmitted to hospital for elevated bilirubin. Oncology to consult for care plan.    Plan: Sw to assist w/ d/c planning as needed.

## 2017-07-04 NOTE — PROGRESS NOTES
"Pt A&Ox4 VS: /53 mmHg  Pulse 91  Temp(Src) 36.1 °C (96.9 °F)  Resp 18  Ht 1.854 m (6' 1\")  Wt 130.9 kg (288 lb 9.3 oz)  BMI 38.08 kg/m2  SpO2 96% Pt denies n/v, numbness, tingling, SOB and chest pain. Pt states pain 8/10, prn pain medication administered per MAR. Family at bedside.met at this time, call light within reach, hourly rounding in effect, and will continue to monitor.       "

## 2017-07-04 NOTE — ASSESSMENT & PLAN NOTE
No GI intervention would be of help as no diliation on US seen, even on repeat yesterday.  T bili 9.9

## 2017-07-04 NOTE — CARE PLAN
Problem: Safety  Goal: Will remain free from injury  Hourly rounding in effect, pt instructed to call for assistance, bed locked and in lowest position. Bed alarm off, with Carito as second RN. Pt calls appropriately, ambulates steadily, personal possessions within reach        Problem: Knowledge Deficit  Goal: Knowledge of disease process/condition, treatment plan, diagnostic tests, and medications will improve  Pt updated and educated on nursing interventions, medications and POC        Problem: Pain Management  Goal: Pain level will decrease to patient’s comfort goal  PRN pain medication administered per MAR, will continue to reassess and monitor.

## 2017-07-04 NOTE — CARE PLAN
Problem: Communication  Goal: The ability to communicate needs accurately and effectively will improve  Patient uses call light appropriately for communication with staff    Problem: Bowel/Gastric:  Goal: Normal bowel function is maintained or improved  Last BM 07/02 per patient. Bowel protocol in place.

## 2017-07-04 NOTE — PROGRESS NOTES
Report received from day shift RN, patient care assumed at 1900.   Patient assessment as documented.   Patient denies pain or discomfort at this time.   Breathing is even and unlabored on room air  Patient is ambulatory per self   BLE edema noted.   Patient is resting in bed. Bed in low position, call light within reach. Hourly rounding in place.  Patient has no further complaints at this time, will continue to monitor.

## 2017-07-05 ENCOUNTER — APPOINTMENT (OUTPATIENT)
Dept: ONCOLOGY | Facility: MEDICAL CENTER | Age: 52
End: 2017-07-05
Attending: INTERNAL MEDICINE
Payer: COMMERCIAL

## 2017-07-05 VITALS
WEIGHT: 288.58 LBS | HEIGHT: 73 IN | TEMPERATURE: 97.2 F | BODY MASS INDEX: 38.25 KG/M2 | DIASTOLIC BLOOD PRESSURE: 60 MMHG | SYSTOLIC BLOOD PRESSURE: 97 MMHG | OXYGEN SATURATION: 97 % | HEART RATE: 92 BPM | RESPIRATION RATE: 16 BRPM

## 2017-07-05 DIAGNOSIS — C25.9 PANCREATIC CANCER METASTASIZED TO LIVER (HCC): ICD-10-CM

## 2017-07-05 DIAGNOSIS — C78.7 PANCREATIC CANCER METASTASIZED TO LIVER (HCC): ICD-10-CM

## 2017-07-05 LAB
ALBUMIN SERPL BCP-MCNC: 2.4 G/DL (ref 3.2–4.9)
ALBUMIN/GLOB SERPL: 1 G/DL
ALP SERPL-CCNC: 821 U/L (ref 30–99)
ALT SERPL-CCNC: 56 U/L (ref 2–50)
ANION GAP SERPL CALC-SCNC: 7 MMOL/L (ref 0–11.9)
AST SERPL-CCNC: 77 U/L (ref 12–45)
BASOPHILS # BLD AUTO: 0.6 % (ref 0–1.8)
BASOPHILS # BLD: 0.03 K/UL (ref 0–0.12)
BILIRUB SERPL-MCNC: 10.6 MG/DL (ref 0.1–1.5)
BUN SERPL-MCNC: 10 MG/DL (ref 8–22)
CALCIUM SERPL-MCNC: 7.4 MG/DL (ref 8.5–10.5)
CHLORIDE SERPL-SCNC: 97 MMOL/L (ref 96–112)
CO2 SERPL-SCNC: 31 MMOL/L (ref 20–33)
CREAT SERPL-MCNC: 0.5 MG/DL (ref 0.5–1.4)
EOSINOPHIL # BLD AUTO: 0.33 K/UL (ref 0–0.51)
EOSINOPHIL NFR BLD: 6.4 % (ref 0–6.9)
ERYTHROCYTE [DISTWIDTH] IN BLOOD BY AUTOMATED COUNT: 53.4 FL (ref 35.9–50)
GFR SERPL CREATININE-BSD FRML MDRD: >60 ML/MIN/1.73 M 2
GLOBULIN SER CALC-MCNC: 2.5 G/DL (ref 1.9–3.5)
GLUCOSE BLD-MCNC: 102 MG/DL (ref 65–99)
GLUCOSE BLD-MCNC: 188 MG/DL (ref 65–99)
GLUCOSE SERPL-MCNC: 127 MG/DL (ref 65–99)
HCT VFR BLD AUTO: 32.1 % (ref 42–52)
HGB BLD-MCNC: 10.6 G/DL (ref 14–18)
IMM GRANULOCYTES # BLD AUTO: 0.02 K/UL (ref 0–0.11)
IMM GRANULOCYTES NFR BLD AUTO: 0.4 % (ref 0–0.9)
LYMPHOCYTES # BLD AUTO: 0.67 K/UL (ref 1–4.8)
LYMPHOCYTES NFR BLD: 13.1 % (ref 22–41)
MCH RBC QN AUTO: 29 PG (ref 27–33)
MCHC RBC AUTO-ENTMCNC: 33 G/DL (ref 33.7–35.3)
MCV RBC AUTO: 87.7 FL (ref 81.4–97.8)
MONOCYTES # BLD AUTO: 0.59 K/UL (ref 0–0.85)
MONOCYTES NFR BLD AUTO: 11.5 % (ref 0–13.4)
NEUTROPHILS # BLD AUTO: 3.48 K/UL (ref 1.82–7.42)
NEUTROPHILS NFR BLD: 68 % (ref 44–72)
NRBC # BLD AUTO: 0 K/UL
NRBC BLD AUTO-RTO: 0 /100 WBC
PLATELET # BLD AUTO: 116 K/UL (ref 164–446)
PMV BLD AUTO: 10.8 FL (ref 9–12.9)
POTASSIUM SERPL-SCNC: 3.2 MMOL/L (ref 3.6–5.5)
PROT SERPL-MCNC: 4.9 G/DL (ref 6–8.2)
RBC # BLD AUTO: 3.66 M/UL (ref 4.7–6.1)
SODIUM SERPL-SCNC: 135 MMOL/L (ref 135–145)
WBC # BLD AUTO: 5.1 K/UL (ref 4.8–10.8)

## 2017-07-05 PROCEDURE — 3E04305 INTRODUCTION OF OTHER ANTINEOPLASTIC INTO CENTRAL VEIN, PERCUTANEOUS APPROACH: ICD-10-PCS | Performed by: INTERNAL MEDICINE

## 2017-07-05 PROCEDURE — 82962 GLUCOSE BLOOD TEST: CPT

## 2017-07-05 PROCEDURE — 99239 HOSP IP/OBS DSCHRG MGMT >30: CPT | Performed by: INTERNAL MEDICINE

## 2017-07-05 PROCEDURE — 85025 COMPLETE CBC W/AUTO DIFF WBC: CPT

## 2017-07-05 PROCEDURE — 700102 HCHG RX REV CODE 250 W/ 637 OVERRIDE(OP): Performed by: FAMILY MEDICINE

## 2017-07-05 PROCEDURE — 700101 HCHG RX REV CODE 250: Performed by: INTERNAL MEDICINE

## 2017-07-05 PROCEDURE — 700111 HCHG RX REV CODE 636 W/ 250 OVERRIDE (IP): Performed by: INTERNAL MEDICINE

## 2017-07-05 PROCEDURE — A9270 NON-COVERED ITEM OR SERVICE: HCPCS | Performed by: INTERNAL MEDICINE

## 2017-07-05 PROCEDURE — 700102 HCHG RX REV CODE 250 W/ 637 OVERRIDE(OP): Performed by: INTERNAL MEDICINE

## 2017-07-05 PROCEDURE — 80053 COMPREHEN METABOLIC PANEL: CPT

## 2017-07-05 PROCEDURE — 700105 HCHG RX REV CODE 258: Performed by: INTERNAL MEDICINE

## 2017-07-05 PROCEDURE — A9270 NON-COVERED ITEM OR SERVICE: HCPCS | Performed by: FAMILY MEDICINE

## 2017-07-05 RX ORDER — OXYCODONE HYDROCHLORIDE 10 MG/1
10 TABLET ORAL
Status: DISCONTINUED | OUTPATIENT
Start: 2017-07-05 | End: 2017-07-05 | Stop reason: HOSPADM

## 2017-07-05 RX ORDER — MORPHINE SULFATE 30 MG/1
30 TABLET, FILM COATED, EXTENDED RELEASE ORAL EVERY 12 HOURS
Qty: 60 TAB | Refills: 0 | Status: SHIPPED | OUTPATIENT
Start: 2017-07-13

## 2017-07-05 RX ORDER — ONDANSETRON 2 MG/ML
8 INJECTION INTRAMUSCULAR; INTRAVENOUS ONCE
Status: COMPLETED | OUTPATIENT
Start: 2017-07-05 | End: 2017-07-05

## 2017-07-05 RX ORDER — OXYCODONE HYDROCHLORIDE 10 MG/1
10 TABLET ORAL EVERY 4 HOURS PRN
Qty: 90 TAB | Refills: 0 | Status: SHIPPED | OUTPATIENT
Start: 2017-07-05

## 2017-07-05 RX ADMIN — POTASSIUM CHLORIDE 20 MEQ: 1500 TABLET, EXTENDED RELEASE ORAL at 08:05

## 2017-07-05 RX ADMIN — OXYCODONE HYDROCHLORIDE 10 MG: 10 TABLET ORAL at 06:03

## 2017-07-05 RX ADMIN — HEPARIN 500 UNITS: 100 SYRINGE at 14:15

## 2017-07-05 RX ADMIN — OXYCODONE HYDROCHLORIDE 10 MG: 10 TABLET ORAL at 01:30

## 2017-07-05 RX ADMIN — INSULIN LISPRO 2 UNITS: 100 INJECTION, SOLUTION INTRAVENOUS; SUBCUTANEOUS at 12:39

## 2017-07-05 RX ADMIN — POTASSIUM CHLORIDE AND SODIUM CHLORIDE: 900; 150 INJECTION, SOLUTION INTRAVENOUS at 08:03

## 2017-07-05 RX ADMIN — OXYCODONE HYDROCHLORIDE 10 MG: 10 TABLET ORAL at 14:14

## 2017-07-05 RX ADMIN — GEMCITABINE HYDROCHLORIDE 2079 MG: 1 INJECTION, POWDER, LYOPHILIZED, FOR SOLUTION INTRAVENOUS at 13:16

## 2017-07-05 RX ADMIN — STANDARDIZED SENNA CONCENTRATE AND DOCUSATE SODIUM 2 TABLET: 8.6; 5 TABLET, FILM COATED ORAL at 08:05

## 2017-07-05 RX ADMIN — OXYCODONE HYDROCHLORIDE 10 MG: 10 TABLET ORAL at 10:15

## 2017-07-05 RX ADMIN — MORPHINE SULFATE 30 MG: 30 TABLET, EXTENDED RELEASE ORAL at 08:05

## 2017-07-05 RX ADMIN — ONDANSETRON 8 MG: 2 INJECTION INTRAMUSCULAR; INTRAVENOUS at 12:44

## 2017-07-05 ASSESSMENT — ENCOUNTER SYMPTOMS
MYALGIAS: 0
DIZZINESS: 0
BLURRED VISION: 1
CONSTIPATION: 1
WEAKNESS: 1
NAUSEA: 0
SEIZURES: 0
COUGH: 0
ORTHOPNEA: 0
FEVER: 0
FOCAL WEAKNESS: 0
EYE PAIN: 0
BACK PAIN: 1
PHOTOPHOBIA: 0
SPUTUM PRODUCTION: 0
NECK PAIN: 0
DIARRHEA: 0
TINGLING: 0
CHILLS: 0
ABDOMINAL PAIN: 1
HEMOPTYSIS: 0
BRUISES/BLEEDS EASILY: 0
PALPITATIONS: 0
DEPRESSION: 0
WHEEZING: 0
POLYDIPSIA: 0
HALLUCINATIONS: 0
SENSORY CHANGE: 0
NERVOUS/ANXIOUS: 0
EYE DISCHARGE: 0
SPEECH CHANGE: 0
HEADACHES: 0
VOMITING: 0
BLOOD IN STOOL: 0
SHORTNESS OF BREATH: 1
FALLS: 0
DOUBLE VISION: 0
HEARTBURN: 0
TREMORS: 0
SORE THROAT: 0

## 2017-07-05 ASSESSMENT — PAIN SCALES - GENERAL
PAINLEVEL_OUTOF10: 10
PAINLEVEL_OUTOF10: 8
PAINLEVEL_OUTOF10: 0
PAINLEVEL_OUTOF10: 5
PAINLEVEL_OUTOF10: 8
PAINLEVEL_OUTOF10: 5

## 2017-07-05 ASSESSMENT — LIFESTYLE VARIABLES: SUBSTANCE_ABUSE: 0

## 2017-07-05 NOTE — PROGRESS NOTES
Chemotherapy Verification - PRIMARY RN      Height = 185.4 cm  Weight = 130.9 kg  BSA = 2.6 m2       Medication: Gemcitabine  Dose: 800 mg/m2  Calculated Dose: 2080 mg (Ordered dose:2080 mg)                             (In mg/m2, AUC, mg/kg)       I confirm this process was performed independently with the BSA and all final chemotherapy dosing calculations congruent.  Any discrepancies of 5% or greater have been addressed with the chemotherapy pharmacist. The resolution of the discrepancy has been documented in the EPIC progress notes.

## 2017-07-05 NOTE — DISCHARGE PLANNING
Sw met w/ pt and SO. Pt to d/c to SO house at [address] [place] [address]. SO and hospital [facility] completed AD  For [facility] w/ pt. Pt is able to complete ADLs. Pt accepts help from SO for IADLs in some cases such as driving and homemaking.     Care Transition Team Assessment    Information Source  Orientation : Oriented x 4  Information Given By: Significant Other  Informant's Name: Hodan Mitchell  Who is responsible for making decisions for patient? : Patient    Readmission Evaluation  Is this a readmission?: Yes - unplanned readmission  Why do you think you were readmitted?: Pt bilirubin shelly again    Elopement Risk  Legal Hold: No  Ambulatory or Self Mobile in Wheelchair: Yes  Disoriented: No  Psychiatric Symptoms: None  History of Wandering: No  Elopement this Admit: No  Vocalizing Wanting to Leave: No  Displays Behaviors, Body Language Wanting to Leave: No-Not at Risk for Elopement  Elopement Risk: Not at Risk for Elopement    Interdisciplinary Discharge Planning  Does Admitting Nurse Feel This Could be a Complex Discharge?: No  Primary Care Physician: HUSAM Moss  Lives with - Patient's Self Care Capacity: Significant Other  Patient or legal guardian wants to designate a caregiver (see row info): Yes  Support Systems: Spouse / Significant Other  Housing / Facility: Mobile Home  Do You Take your Prescribed Medications Regularly: Yes  Able to Return to Previous ADL's: Yes  Mobility Issues: No  Prior Services: None  Patient Expects to be Discharged to:: home  Assistance Needed: No  Durable Medical Equipment: Home Oxygen    Discharge Preparedness  What is your plan after discharge?: Home with help  What are your discharge supports?: Spouse  Prior Functional Level: Ambulatory, Independent with Activities of Daily Living  Difficulity with ADLs: None    Functional Assesment  Prior Functional Level: Ambulatory, Independent with Activities of Daily Living    Finances  Financial Barriers to Discharge:  No  Prescription Coverage: Yes ([employer] in [place])    Vision / Hearing Impairment  Vision Impairment : No  Hearing Impairment : No    Values / Beliefs / Concerns  Values / Beliefs Concerns : No    Advance Directive  Advance Directive?: None  Advance Directive offered?: AD Booklet given    Domestic Abuse  Have you ever been the victim of abuse or violence?: No  Physical Abuse or Sexual Abuse: No  Verbal Abuse or Emotional Abuse: No  Possible Abuse Reported to:: Not Applicable    Psychological Assessment  History of Substance Abuse: None  History of Psychiatric Problems: No         Anticipated Discharge Information  Anticipated discharge disposition: Home  Discharge Address: [facility] [address] [address]  Discharge Contact Phone Number: [phone]

## 2017-07-05 NOTE — DISCHARGE PLANNING
Medical SW    Referral: Sw met w/ pt and Hodan LOO. Pt has AD and needs support w/ alexi. SO has Dipti's contact number to schedule an appointment. Pt is hoping to d/c today following chemo at 1pm.       Intervention: Sw acquired completed Certificate of Competency for pt and faxed to Dipti truong. Sw received fax confirmation. Document placed in scan basket. Sw called Dipti and left VM.    Sw met w/ Dipti following completion of AD.    Plan: Sw to assist w/ d/c planning as needed.

## 2017-07-05 NOTE — PROGRESS NOTES
Pt started having nose bleed at end of shift that he was unable to stop. Gauze packed into nose. Discussed with NOC RN to monitor. Dr. Christian notified and to hold night dose of xarelto

## 2017-07-05 NOTE — PROGRESS NOTES
"Patient Name: Errol Sauceda   Dx: Metastatic Pancreatic Cancer         Protocol: Gemcitabine      *Dosing Reference*  Gemcitabine 1000 mg/m2 IV over 30 min on Days 1, 8, and 15  - Initial dose reduced by 20% (800 mg/m2) for elevated Tbili per Dr. Aly.    Repeat every 28 days until DP or UT  NCCN Guidelines for Pancreatic Adenocarcinoma V.2.2016.  Ken SNEED, et al. J Clin Oncol. 1997;15(6):2403-13.    Allergies:  Review of patient's allergies indicates no known allergies.     BP 95/63 mmHg  Pulse 71  Temp(Src) 36.3 °C (97.3 °F)  Resp 18  Ht 1.854 m (6' 1\")  Wt 130.9 kg (288 lb 9.3 oz)  BMI 38.08 kg/m2  SpO2 99% Body surface area is 2.60 meters squared.     Labs 7/5/17:  ANC~ 3480 Plt = 116k   Hgb = 10.6     SCr = 0.5 mg/dL CrCl > 125mL/min (min Scr 0.7 used)    AST/ALT/AP/TBili = 77/56/821/10.6    ** Dr. Aly aware of all current lab results. Orders received to proceed with treatment 7/5/17 with dose reduction.       Drug Order   (Drug name, dose, route, IV Fluid & volume, frequency, number of doses) Cycle: 1      Previous treatment: 6/19/17 = cycle 1 Folfirinox, regimen changed for elevated LFTs/Tbili     Medication = Gemcitaine  Base Dose= 800 mg/m2   Calc Dose: Base Dose x 2.6 m2 = 2080 mg  Final Dose = 2079 mg  Route = IV  Fluid & Volume =  mL  Admin Duration = Over 30 min           <5% difference, ok to treat with final  dose       By my signature below, I confirm this process was performed independently with the BSA and all final chemotherapy dosing calculations congruent. I have reviewed the above chemotherapy order and that my calculation of the final dose and BSA (when applicable) corroborate those calculations of the  pharmacist. Discrepancies of 5% or greater in the written dose have been addressed and documented within the [facility] Progress notes.    Signature: Melodie Hill, PharmD           "

## 2017-07-05 NOTE — PROGRESS NOTES
Bedside report received from night shift, assumed care of pt at 0715. Pt in bed resting comfortably with no s/sx of pain or discomfort.  Pt A&O X 4. Denying pain at this time. Up self. Pt calls appropriately for medication or assistance as needed. Call light within reach, all appropriate fall precautions in place and personal belongings within reach; hourly rounding in place. No needs at this time. See flowsheets for further assessment details.

## 2017-07-05 NOTE — PROGRESS NOTES
"Pharmacy Chemotherapy calculation:    DX: metastatic pancreatic cancer  Cycle 1    Day 1    Previous treatment = FolFirinox x 1 cycle 6/19/17- discontinued due to elevated Tbili/Lft's    Regimen: gemcitabine  Gemcitabine 1000mg/m2 IV over 30 min on days 1,8, and 15  --initial dose reduced to 800mg/m2 for elevated T.Bili per Dr. Aly  q28 days until DP/UT (metastatic)  NCCN Guidelines for Pancreatic Adenocarcinoma V.2.2016  Ken SNEED, et al - J Clin Oncol. 1997 Jun;15(6):2403-13.  Angella SILVESTRE et al -  CATARINA. 2010 Sep 8;304(10):1073-96.      BP 95/63 mmHg  Pulse 71  Temp(Src) 36.3 °C (97.3 °F)  Resp 18  Ht 1.854 m (6' 1\")  Wt 130.9 kg (288 lb 9.3 oz)  BMI 38.08 kg/m2  SpO2 99%  Body surface area is 2.60 meters squared.     ANC~ 3480 Plt = 116k   Hgb = 10.6     SCr = 0.5mg/dL CrCl > 125mL/min   LFT's = 77/59/821 TBili = 10.6     MD aware of all current lab results. Orders received to proceed with treatment at an initial reduced dose of gemcitabine.      Gemcitabine 800mg/m2 x 2.6m2 = 2080mg   <5% difference, ok to treat with final dose = 2079mg IV         SCHUYLER Rodriguez Pharm.D.      "

## 2017-07-05 NOTE — PROGRESS NOTES
Discharged home with wife. Appointments scheduled for follow up with labs and Dr. Moss. Prescriptions given to patient. Port de accessed. Education provided to patient. Wrist band removed. Patient discharged by wheelchair with transport. Chemo education provided. Questions answered.

## 2017-07-05 NOTE — PROGRESS NOTES
Chemotherapy Verification - SECONDARY RN       Height = 185.4cm  Weight = 130.9kg  BSA = 2.6m^2       Medication: Gemzar  Dose: 800mg/m^2  Calculated Dose: 2080mg                             (In mg/m2, AUC, mg/kg)       I confirm that this process was performed independently.

## 2017-07-05 NOTE — CARE PLAN
Problem: Pain Management  Goal: Pain level will decrease to patient’s comfort goal  Outcome: PROGRESSING AS EXPECTED  Patient calls appropriately for medication as needed. Uses 0 to 10 pain scale appropriately.

## 2017-07-05 NOTE — CARE PLAN
Problem: Acute Care of the Chemotherapy Patient  Goal: Optimal Outcome for the Chemotherapy Patient  Intervention: If existing line or port, determine patency  Port present,patent,+return  Intervention: Two RNs to verify MD order, drug dosage calculation, chemotherapy medication as mixed by pharmacy  2RNs verified labs, orders. Results clarified with MD Aly and dose adjusted per MAR  Intervention: Initiate chemotherapy precautions and maintain for 48 hours following completion of chemotherapy  Chemotherapy precautions in place.  Intervention: Pre medicate with antiemetics and other meds as ordered  Zofran per MAR

## 2017-07-05 NOTE — DISCHARGE INSTRUCTIONS
Discharge Instructions    Discharged to home by car with relative. Discharged via wheelchair, hospital escort: Yes.  Special equipment needed: Not Applicable    Be sure to schedule a follow-up appointment with your primary care doctor or any specialists as instructed.     Discharge Plan:   Influenza Vaccine Indication: Patient Refuses    I understand that a diet low in cholesterol, fat, and sodium is recommended for good health. Unless I have been given specific instructions below for another diet, I accept this instruction as my diet prescription.   Other diet: Diabetic    Special Instructions: None    · Is patient discharged on Warfarin / Coumadin?   No     · Is patient Post Blood Transfusion?  No    Depression / Suicide Risk    As you are discharged from this Critical access hospital facility, it is important to learn how to keep safe from harming yourself.    Recognize the warning signs:  · Abrupt changes in personality, positive or negative- including increase in energy   · Giving away possessions  · Change in eating patterns- significant weight changes-  positive or negative  · Change in sleeping patterns- unable to sleep or sleeping all the time   · Unwillingness or inability to communicate  · Depression  · Unusual sadness, discouragement and loneliness  · Talk of wanting to die  · Neglect of personal appearance   · Rebelliousness- reckless behavior  · Withdrawal from people/activities they love  · Confusion- inability to concentrate     If you or a loved one observes any of these behaviors or has concerns about self-harm, here's what you can do:  · Talk about it- your feelings and reasons for harming yourself  · Remove any means that you might use to hurt yourself (examples: pills, rope, extension cords, firearm)  · Get professional help from the community (Mental Health, Substance Abuse, psychological counseling)  · Do not be alone:Call your Safe Contact- someone whom you trust who will be there for you.  · Call your  local CRISIS HOTLINE 172-8712 or 788-336-3358  · Call your local Children's Mobile Crisis Response Team [place] (325) 991-8372 or www.Queryday  · Call the toll free National Suicide Prevention Hotlines   · National Suicide Prevention Lifeline 652-486-THWK (2167)  · National [employer] Line Network 800-SUICIDE (282-5644)

## 2017-07-05 NOTE — PROGRESS NOTES
Received report from day RN and assumed care of pt at change of shift.  Nosebleed stopped. Medicated pt per MAR.   Pt is up self, I asked him to call if needs assistance getting up, he declined stating he's fine on his own. Education given regarding fall risk in hospital, he understands and again said he can get up to the bathroom fine on his own.  Port patent, + blood return. Dressing CDI.  Pt has slippers from home on feet. Wife at bedside.  Assessment complete, see Epic flowsheet for details.  Call light within reach, bed in lowest and locked position. All needs met at this time.

## 2017-07-05 NOTE — PROGRESS NOTES
Oncology/Hematology Consult               Author: Ovidio Aly    Cc:  Pancreatic cancer Date & Time created: 7/5/2017  9:32 AM     History of Present Illness:       Errol Sauceda is a 52 y.o. male with h/o DM, HTN, hyperlipidemia who also has a h/o metastatic pancreatic cancer.  This was diagnosed initially at [facility] in the setting of SOB and abdominal pain related to bilateral PE's and a RLE DVT.  He has been on xarelto for the tx of the blood clots.  His imaging showed numerous liver metastasis.      He established care with Dr. Moss at the [facility] Oncology clinic on 6/12/17.  He underwent a CT guided bx of one of the liver lesions on 6/14, as well as a celiac axis nerve block procedure and a port placement for chemo.  The pathology confirmed pancreatic adenocarcinoma.  Treatment options were discussed, and he elected for FOLFIRINOX chemotherapy.  At that time his bili was 1.7.  He got his first dose of the chemo on 6/19/17.      He did end up in the hospital on 6/23, because of weakness, dehydration, encephalopathy with confusion, and a bili that had increased to 6.1,  Imaging showed progression of the disease in the liver, but no extrahepatic biliary ductal dilation.  GI was consulted but there was no intervenble disease that could be stented (as the biliary obstruction was all intraepatic).  He was discharge on 6/26 after medical tune up.      He was scheduled to get the next FOLFIRINOX, cycle #2 on 7/2/17.  Unfortunately at that appt, his bili was 9.  He was directed to come into the hospital as a direct admit for further treatment of the hyperbilirubinemia.  He has some fatigue but otherwise feels fairly well.  We have been asked to consult in the case.      Past Medical History    Diagnosis  Date    •  Diabetes (CMS-HCC)           Metastatic pancreatic cancer to liver       History of DVT       History of PE        PAST SURGICAL HISTORY  Bariatric gastric sleeve  Nose fracture  repair    FAMILY HISTORY  No major history of cancer in the close relatives that he know of    SOCIAL HISTORY  Social History    Substance Use Topics    •  Smoking status: former Former Smoker        Types:  Cigarettes        Quit date:  15 years ago    •  Smokeless tobacco:  Never Used    •  Alcohol Use:  Social, about 5 x per month       ALLERGIES  Allergies as of 07/03/2017    •  (No Known Allergies)        OUTPATIENT MEDICATIONS  Medication list reviewed            Review of Systems:  Review of Systems   Constitutional: Positive for malaise/fatigue. Negative for fever and chills.   HENT: Negative for congestion, ear pain, nosebleeds and sore throat.    Eyes: Positive for blurred vision. Negative for double vision, photophobia, pain and discharge.   Respiratory: Positive for shortness of breath. Negative for cough, hemoptysis, sputum production and wheezing.    Cardiovascular: Positive for leg swelling. Negative for chest pain, palpitations and orthopnea.   Gastrointestinal: Positive for abdominal pain and constipation. Negative for heartburn, nausea, vomiting, diarrhea, blood in stool and melena.   Genitourinary: Negative for dysuria, urgency, frequency and hematuria.   Musculoskeletal: Positive for back pain. Negative for myalgias, falls and neck pain.   Skin: Negative for itching and rash.   Neurological: Positive for weakness. Negative for dizziness, tingling, tremors, sensory change, speech change, focal weakness, seizures and headaches.   Endo/Heme/Allergies: Negative for environmental allergies and polydipsia. Does not bruise/bleed easily.   Psychiatric/Behavioral: Negative for depression, hallucinations and substance abuse. The patient is not nervous/anxious.        Physical Exam:  Physical Exam   Constitutional: He is oriented to person, place, and time. He appears well-developed and well-nourished. No distress.   Jaundiced, ECOG=1   HENT:   Head: Normocephalic and atraumatic.   Mouth/Throat: Oropharynx  is clear and moist. No oropharyngeal exudate.   Eyes: Conjunctivae and EOM are normal. Pupils are equal, round, and reactive to light. Right eye exhibits no discharge. Left eye exhibits no discharge. Scleral icterus is present.   Neck: Normal range of motion. Neck supple. No tracheal deviation present. No thyromegaly present.   Cardiovascular: Normal rate, regular rhythm and normal heart sounds.  Exam reveals no gallop and no friction rub.    No murmur heard.  Pulmonary/Chest: Effort normal and breath sounds normal. No respiratory distress. He has no wheezes. He has no rales. He exhibits no tenderness.   Abdominal: Soft. Bowel sounds are normal. He exhibits no distension. There is tenderness. There is no rebound and no guarding.   Musculoskeletal: Normal range of motion. He exhibits edema. He exhibits no tenderness.   3+ bilat LE edema   Lymphadenopathy:     He has no cervical adenopathy.   Neurological: He is alert and oriented to person, place, and time. No cranial nerve deficit. Coordination normal.   Skin: Skin is warm and dry. No rash noted. He is not diaphoretic. No erythema.   Psychiatric: He has a normal mood and affect. His behavior is normal. Judgment and thought content normal.       Labs:        Invalid input(s): [id number]      Recent Labs      07/03/17   0845  07/04/17   0030  07/05/17   0120   SODIUM  132*  135  135   POTASSIUM  3.4*  3.0*  3.2*   CHLORIDE  93*  95*  97   CO2  26  32  31   BUN  13  12  10   CREATININE  0.59  0.53  0.50   MAGNESIUM  2.0   --    --    CALCIUM  7.9*  8.0*  7.4*     Recent Labs      07/03/17   0845  07/04/17   0030  07/05/17   0120   ALTSGPT  67*  61*  56*   ASTSGOT  88*  68*  77*   ALKPHOSPHAT  965*  865*  821*   TBILIRUBIN  9.9*  9.9*  10.6*   GLUCOSE  235*  130*  127*     Recent Labs      07/03/17   0845  07/04/17   0030  07/05/17   0120   RBC  4.04*  3.78*  3.66*   HEMOGLOBIN  11.8*  11.0*  10.6*   HEMATOCRIT  35.8*  33.0*  32.1*   PLATELETCT  131*  130*  116*      Recent Labs      17   0845  17   0030  17   0120   WBC  7.5  6.2  5.1   NEUTSPOLYS  79.40*  67.50  68.00   LYMPHOCYTES  7.40*  13.60*  13.10*   MONOCYTES  9.90  12.90  11.50   EOSINOPHILS  2.40  4.80  6.40   BASOPHILS  0.40  0.60  0.60   ASTSGOT  88*  68*  77*   ALTSGPT  67*  61*  56*   ALKPHOSPHAT  965*  865*  821*   TBILIRUBIN  9.9*  9.9*  10.6*     Recent Labs      17   0845  17   0030  17   0120   SODIUM  132*  135  135   POTASSIUM  3.4*  3.0*  3.2*   CHLORIDE  93*  95*  97   CO2  26  32  31   GLUCOSE  235*  130*  127*   BUN  13  12  10   CREATININE  0.59  0.53  0.50   CALCIUM  7.9*  8.0*  7.4*     Hemodynamics:  Temp (24hrs), Av.4 °C (97.5 °F), Min:36.3 °C (97.3 °F), Max:36.6 °C (97.8 °F)  Temperature: 36.3 °C (97.3 °F)  Pulse  Av.6  Min: 71  Max: 136   Blood Pressure: (!) 95/63 mmHg     Respiratory:    Respiration: 18, Pulse Oximetry: 99 %        RUL Breath Sounds: Clear;Diminished, RML Breath Sounds: Diminished, RLL Breath Sounds: Diminished, KITTY Breath Sounds: Clear;Diminished, LLL Breath Sounds: Diminished  Fluids:    Intake/Output Summary (Last 24 hours) at 17 0932  Last data filed at 17 2100   Gross per 24 hour   Intake   1229 ml   Output      0 ml   Net   1229 ml       GI/Nutrition:  Orders Placed This Encounter   Procedures   • Diet Order     Standing Status: Standing      Number of Occurrences: 1      Standing Expiration Date:      Order Specific Question:  Diet:     Answer:  Diabetic [3]     Medical Decision Making, by Problem:  Active Hospital Problems    Diagnosis   • Hypokalemia [E87.6]   • Obstructive jaundice [K83.8]   • Thrombocytopenia (CMS-HCC) [D69.6]   • Elevated alkaline phosphatase level [R74.8]   • Pulmonary emboli (CMS-HCC) [I26.99]   • Pancreatic cancer metastasized to liver (CMS-HCC) [C25.9, C78.7]       Plan:  Errol Sauceda is a 52 y.o. male with h/o DM, HTN, hyperlipidemia who also has a h/o metastatic pancreatic cancer.   He is admitted now because of worsening elevation of bilirubin.        At this point the bili elevation is related to intrahepatic disease and cannot be treated with GI intervention such as stenting.  The only way to decrease the bili is by treating the underlying cancer, with hopes that shrinking the cancer will normalize the liver function.  The problem is that many of the chemo's rely on normal liver biliary excretion for proper clearance from the body.  Without this, many of the chemo's simply become too toxic, as toxic levels of the chemo drugs can build in the body.  As such the recommendations for irinotecan, 5-FU, and abraxane are not to use these medicines with a bili that is this high.  This certainly limits our treatment options.      About the only thing we could consider using is gemzar single agent.  This has been used for many years in the past for metastatic pancreatic cancer.  It is not as effective as the newer combination chemo regimens.  The response rate is about 15-30%.  It is a/w prolongation of the median OS by about 3 months.  However, w/o any treatment except best supportive care, his MOS is about 3-6 months.  We did discuss hospice as an alternative option.      At this point he would like to try the gemzar.  We discussed the side effects.  It is generally well tolerated.  We do have to watch for fatigue and low counts, but not much in the way of nausea, hair loss, neuropathy, or other side effects.  It is given weekly x2-3 weeks in a row, followed by 1 week off.  He will get his first dose today.  They will call Dr. Moss's office to schedule the next couple weeks of weekly therapy.  He will continue to do his labs 2 days per week in [place], with results faxed to Dr. Moss, to monitor his counts.      After he finished the chemo today, it would be okay for him to d/c home.  There is no other active reason to keep him here, as nothing can be done with the elevated bili.  He should  get back onto his xarelto, as there are no plans for any procedures at this point.      Highly complex case with extensive discussion of prognosis, treatment options, side effects and alternatives.        Labs reviewed, Medications reviewed and Radiology images reviewed  Bennett catheter: No Bennett  Central line in place: Chemotherapy

## 2017-07-05 NOTE — CARE PLAN
Problem: Safety  Goal: Will remain free from injury  Outcome: PROGRESSING AS EXPECTED  Pt remained safe and free from fall/injury throughout shift. Education given about fall risk in hospital.     Problem: Pain Management  Goal: Pain level will decrease to patient’s comfort goal  Intervention: Follow pain managment plan developed in collaboration with patient and Interdisciplinary Team  Medicated per MAR for pain; pt reassessed within 2 hours to evaluate effectiveness.

## 2017-07-06 ENCOUNTER — PATIENT OUTREACH (OUTPATIENT)
Dept: HEALTH INFORMATION MANAGEMENT | Facility: OTHER | Age: 52
End: 2017-07-06

## 2017-07-06 NOTE — DISCHARGE SUMMARY
CHIEF COMPLAINT ON ADMISSION  No chief complaint on file.  Abnormal bilirubin, pancreatic cancer    CODE STATUS  DNR    HPI & HOSPITAL COURSE  This is a 52 y.o. male here with bilirubin >9 in outpatient clinic in routine labs prior to chemotherapy and sent to ED for workup.  Known pancreatic cancer and recently discharged home last week by me once bilirubin dropped to 4.7.  Both last admission and this admission, imaging showed no biliary dilation that could be assisted by intervention to drop his bilirubin. Oncology consulted and offered trial of Gemzar infusion to see if this would offer any improvement in his cancer status - though likelihood very low.  Patient still wants to treat as aggressively as possible so this was tried prior to discharge.  Pain medications refilled on discharge and  aware report verified.      Therefore, he is discharged in fair and stable condition with close outpatient follow-up.    SPECIFIC OUTPATIENT FOLLOW-UP  Dr Moss next week    DISCHARGE PROBLEM LIST  Active Problems:    Pancreatic cancer metastasized to liver (CMS-HCC) POA: Yes    Obstructive jaundice POA: Yes    Thrombocytopenia (CMS-HCC) POA: Yes    Elevated alkaline phosphatase level POA: Yes    Pulmonary emboli (CMS-HCC) POA: Yes    Hypokalemia POA: Unknown  Resolved Problems:    * No resolved hospital problems. *      FOLLOW UP  Future Appointments  Date Time Provider Department Center   7/12/2017 12:40 PM DEE Shane None   7/12/2017 1:30 PM RN 3 [facility]   7/19/2017 9:00 AM ANGÉLICA Watkins None   7/19/2017 11:00 AM RN 5 [facility]   7/26/2017 8:30 AM ANGÉLICA Watkins None   7/26/2017 11:00 AM RN 5 [facility]   8/2/2017 8:20 AM DEE Shane None   8/2/2017 11:30 AM RN 2 [facility]   8/9/2017 9:30 AM ANGÉLICA Watkins None   8/9/2017 11:30 AM RN 6 [facility]   8/15/2017 3:00 PM ANGÉLICA Watkins None   8/16/2017 1:30  PM RN 2 [facility]   8/23/2017 9:20 AM DEE Shane None   8/23/2017 11:30 AM RN 6 [facility]   8/30/2017 9:30 AM ANGÉLICA Watkins [facility] None   8/30/2017 11:30 AM RN 6 [facility]     Marlon Moss M.D.  [address] [facility] [address]  [facility] [address]  [phone]    In 1 week        MEDICATIONS ON DISCHARGE   Errol Sauceda   Home Medication Instructions CONSTANZA:[id number]    Printed on:07/05/17 8833   Medication Information                      furosemide (LASIX) 40 MG Tab  Take 40 mg by mouth 2 Times a Day.             glimepiride (AMARYL) 4 MG Tab  Take 4 mg by mouth 2 times a day.             insulin glargine (LANTUS) 100 UNIT/ML Solution  Inject 25 Units as instructed every evening.             JARDIANCE 25 MG Tab  Take 25 mg by mouth every day.             metformin (GLUCOPHAGE) 1000 MG tablet  Take 1,000 mg by mouth 2 times a day.             morphine ER (MS CONTIN) 30 MG Tab CR tablet  Take 1 Tab by mouth every 12 hours.             niacin 500 MG Tab  Take 500 mg by mouth 2 times a day.             nystatin (MYCOSTATIN) 803259 UNIT/ML Suspension  Take 5 mL by mouth 4 times a day for 14 days.             oxycodone immediate release (ROXICODONE) 10 MG immediate release tablet  Take 1 Tab by mouth every four hours as needed for Moderate Pain.             potassium chloride SA (KDUR) 20 MEQ Tab CR  Take 20 mEq by mouth every day.             therapeutic multivitamin-minerals (THERAGRAN-M) Tab  Take 1 Tab by mouth every day.             XARELTO 15 MG Tab tablet  Take 15 mg by mouth 2 Times a Day.                 DIET  Orders Placed This Encounter   Procedures   • Diet Order     Standing Status: Standing      Number of Occurrences: 1      Standing Expiration Date:      Order Specific Question:  Diet:     Answer:  Diabetic [3]       ACTIVITY  As tolerated.  Weight bearing as tolerated      CONSULTATIONS  Oncology - Dr Aly    PROCEDURES  none    LABORATORY  Lab Results   Component  Value Date/Time    SODIUM 135 07/05/2017 01:20 AM    POTASSIUM 3.2* 07/05/2017 01:20 AM    CHLORIDE 97 07/05/2017 01:20 AM    CO2 31 07/05/2017 01:20 AM    GLUCOSE 127* 07/05/2017 01:20 AM    BUN 10 07/05/2017 01:20 AM    CREATININE 0.50 07/05/2017 01:20 AM        Lab Results   Component Value Date/Time    WBC 5.1 07/05/2017 01:20 AM    HEMOGLOBIN 10.6* 07/05/2017 01:20 AM    HEMATOCRIT 32.1* 07/05/2017 01:20 AM    PLATELET COUNT 116* 07/05/2017 01:20 AM        Total time of the discharge process exceeds 36 minutes

## 2017-07-07 ENCOUNTER — TELEPHONE (OUTPATIENT)
Dept: HEMATOLOGY ONCOLOGY | Facility: MEDICAL CENTER | Age: 52
End: 2017-07-07

## 2017-07-07 DIAGNOSIS — K83.1 OBSTRUCTIVE JAUNDICE: ICD-10-CM

## 2017-07-07 NOTE — TELEPHONE ENCOUNTER
Patient's wife was calling to verify  MRI appointment . I informed her that the MRI appointment is on Saturday the 8th at 1700

## 2017-07-08 ENCOUNTER — HOSPITAL ENCOUNTER (OUTPATIENT)
Dept: RADIOLOGY | Facility: MEDICAL CENTER | Age: 52
End: 2017-07-08
Attending: INTERNAL MEDICINE
Payer: COMMERCIAL

## 2017-07-08 DIAGNOSIS — K83.1 OBSTRUCTIVE JAUNDICE: ICD-10-CM

## 2017-07-08 PROCEDURE — 74181 MRI ABDOMEN W/O CONTRAST: CPT

## 2017-07-10 ENCOUNTER — TELEPHONE (OUTPATIENT)
Dept: HEMATOLOGY ONCOLOGY | Facility: MEDICAL CENTER | Age: 52
End: 2017-07-10

## 2017-07-10 DIAGNOSIS — C78.7 PANCREATIC CANCER METASTASIZED TO LIVER (HCC): ICD-10-CM

## 2017-07-10 DIAGNOSIS — K13.79 MOUTH SORES: ICD-10-CM

## 2017-07-10 DIAGNOSIS — C25.9 PANCREATIC CANCER METASTASIZED TO LIVER (HCC): ICD-10-CM

## 2017-07-10 NOTE — TELEPHONE ENCOUNTER
Hodan called today and was wondering if you can please call her back about Virgils test results.    Thank you

## 2017-07-10 NOTE — TELEPHONE ENCOUNTER
Called Hodan back and notified her per Dr. Moss that the MRI results showed no blockage of the common bile or pancreatic ducts and the liver shows multiple masses involving all lobes consistent with known metastases. Informed her that Dr. Moss is recommending hospice. Provided emotional support and explained process.  She states she will speak with Errol and call RN back tomorrow.

## 2017-07-10 NOTE — TELEPHONE ENCOUNTER
Patient called stating he has mouth sores and is having trouble swallowing. I will call in a prescription for Magic mouthwash. Patient also been taking nystatin for thrush. He is being seeing this Wednesday, July 12 with Dr. Moss in the office.

## 2017-07-10 NOTE — TELEPHONE ENCOUNTER
Pt's wife called back stating that the patient feels better and wants to know if he can do his labs before making a decision on hospice. Per Dr. Moss we will monitor LFT's and pt can get CBC w/diff, CMP drawn tomorrow, then go from there.  Pt's wife in agreement with plan of care. Pt will have labs drawn tomorrow.

## 2017-07-11 ENCOUNTER — TELEPHONE (OUTPATIENT)
Dept: HEMATOLOGY ONCOLOGY | Facility: MEDICAL CENTER | Age: 52
End: 2017-07-11

## 2017-07-11 DIAGNOSIS — C25.8 OVERLAPPING MALIGNANT NEOPLASM OF PANCREAS (HCC): ICD-10-CM

## 2017-07-11 NOTE — TELEPHONE ENCOUNTER
Patient's sJacideonJaci Pettit called and stated they would like the referral to hospice placed. Informed her that we just received the pt's lab results about an hour ago and Dr. Moss had planned on calling them to review the results and discuss hospice.  She states she would appreciate a call back from Dr. Moss.  RN notified MD.

## 2017-07-12 ENCOUNTER — TELEPHONE (OUTPATIENT)
Dept: ONCOLOGY | Facility: MEDICAL CENTER | Age: 52
End: 2017-07-12

## 2017-07-12 ENCOUNTER — HOSPICE ADMISSION (OUTPATIENT)
Dept: HOSPICE | Facility: HOSPICE | Age: 52
End: 2017-07-12
Payer: COMMERCIAL

## 2017-07-12 NOTE — DISCHARGE PLANNING
Late Entry from 7/5/17 - Received referral to see patient re: Advance Directives along with Certificate of Competency from [employer] . Met with patient - answered questions, completed [id number]

## 2017-07-12 NOTE — DISCHARGE PLANNING
Late Entry from 7/5/17 - Received referral to see patient re: Advance Directives along with Certificate of Competency from [employer] . Met with patient - answered questions, completed document and notarized. Will scan into Epic. CINDY Sauceda - [phone] or [phone]

## 2017-07-12 NOTE — ADDENDUM NOTE
Encounter addended by: Dipti Armstrong on: 7/12/2017  9:48 AM<BR>     Documentation filed: Clinical Notes

## 2017-07-17 ENCOUNTER — APPOINTMENT (OUTPATIENT)
Dept: HEMATOLOGY ONCOLOGY | Facility: MEDICAL CENTER | Age: 52
End: 2017-07-17
Payer: COMMERCIAL

## 2017-07-19 ENCOUNTER — APPOINTMENT (OUTPATIENT)
Dept: ONCOLOGY | Facility: MEDICAL CENTER | Age: 52
End: 2017-07-19
Attending: INTERNAL MEDICINE
Payer: COMMERCIAL

## 2017-07-19 ENCOUNTER — APPOINTMENT (OUTPATIENT)
Dept: ONCOLOGY | Facility: MEDICAL CENTER | Age: 52
End: 2017-07-19
Payer: COMMERCIAL

## 2017-07-26 ENCOUNTER — APPOINTMENT (OUTPATIENT)
Dept: ONCOLOGY | Facility: MEDICAL CENTER | Age: 52
End: 2017-07-26
Attending: INTERNAL MEDICINE
Payer: COMMERCIAL

## 2017-07-27 ENCOUNTER — TELEPHONE (OUTPATIENT)
Dept: HEMATOLOGY ONCOLOGY | Facility: MEDICAL CENTER | Age: 52
End: 2017-07-27

## 2017-07-27 NOTE — TELEPHONE ENCOUNTER
Patient's jeff Pettit called stating that the patient is has not been eating for two days and when he does eat he coughs and spits it back up.  She states the patient's family thinks he needs IV fluids and they don't know where they should take the patient.  She states the patient is not swallowing much and he is oriented to self and place, but he does have some confusion.  She states he is mostly sleeping. She states the hospice program is ran by volunteers and the patient does not have a hospice RN. Per Hodan's request RN also spoke with patient's brother Donaldo.  Provided support and education on the dying process to Hodan and Donaldo. Per Dr. Ricardo explained that if the patient is uncomfortable they can take him to the ER for IV fluids and pain management.  They both verbalized understanding. Encouraged them to call back if they have any questions or concerns.

## 2017-07-31 ENCOUNTER — APPOINTMENT (OUTPATIENT)
Dept: ONCOLOGY | Facility: MEDICAL CENTER | Age: 52
End: 2017-07-31
Attending: INTERNAL MEDICINE
Payer: COMMERCIAL

## 2017-08-02 ENCOUNTER — APPOINTMENT (OUTPATIENT)
Dept: ONCOLOGY | Facility: MEDICAL CENTER | Age: 52
End: 2017-08-02
Payer: COMMERCIAL

## 2017-09-17 NOTE — ADDENDUM NOTE
Encounter addended by: Massiel Bui R.N. on: 9/17/2017 11:16 AM<BR>    Actions taken: Flowsheet accepted